# Patient Record
Sex: MALE | NOT HISPANIC OR LATINO
[De-identification: names, ages, dates, MRNs, and addresses within clinical notes are randomized per-mention and may not be internally consistent; named-entity substitution may affect disease eponyms.]

---

## 2021-06-01 ENCOUNTER — RX ONLY (RX ONLY)
Age: 81
End: 2021-06-01

## 2022-06-09 ENCOUNTER — RX ONLY (RX ONLY)
Age: 82
End: 2022-06-09

## 2023-07-03 ENCOUNTER — OFFICE VISIT (OUTPATIENT)
Dept: URBAN - METROPOLITAN AREA CLINIC 58 | Facility: CLINIC | Age: 83
End: 2023-07-03
Payer: MEDICARE

## 2023-07-03 DIAGNOSIS — H16.141 PUNCTATE KERATITIS OF RIGHT EYE: Primary | ICD-10-CM

## 2023-07-03 PROCEDURE — 99203 OFFICE O/P NEW LOW 30 MIN: CPT | Performed by: STUDENT IN AN ORGANIZED HEALTH CARE EDUCATION/TRAINING PROGRAM

## 2023-07-03 ASSESSMENT — INTRAOCULAR PRESSURE
OD: 15
OS: 15

## 2023-07-03 NOTE — IMPRESSION/PLAN
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 Plan: OD is improving. D/C FML drops. Start PF AT 3-4x a day, Systane or Refresh PF.  Continue under care of retina specialist.

## 2024-07-01 ENCOUNTER — RX ONLY (RX ONLY)
Age: 84
End: 2024-07-01

## 2024-08-01 ENCOUNTER — RX ONLY (RX ONLY)
Age: 84
End: 2024-08-01

## 2024-09-01 ENCOUNTER — RX ONLY (RX ONLY)
Age: 84
End: 2024-09-01

## 2024-11-01 ENCOUNTER — RX ONLY (RX ONLY)
Age: 84
End: 2024-11-01

## 2025-06-06 ENCOUNTER — RX ONLY (RX ONLY)
Age: 85
End: 2025-06-06

## 2025-06-10 ENCOUNTER — APPOINTMENT (OUTPATIENT)
Dept: URBAN - METROPOLITAN AREA CLINIC 145 | Facility: CLINIC | Age: 85
Setting detail: DERMATOLOGY
End: 2025-06-10

## 2025-06-10 DIAGNOSIS — D22 MELANOCYTIC NEVI: ICD-10-CM

## 2025-06-10 PROBLEM — D23.4 OTHER BENIGN NEOPLASM OF SKIN OF SCALP AND NECK: Status: ACTIVE | Noted: 2025-06-10

## 2025-06-10 PROBLEM — D22.4 MELANOCYTIC NEVI OF SCALP AND NECK: Status: ACTIVE | Noted: 2025-06-10

## 2025-06-10 PROBLEM — D48.5 NEOPLASM OF UNCERTAIN BEHAVIOR OF SKIN: Status: ACTIVE | Noted: 2025-06-10

## 2025-06-10 PROCEDURE — ? COUNSELING

## 2025-06-10 PROCEDURE — ? BIOPSY BY SHAVE METHOD

## 2025-06-10 ASSESSMENT — LOCATION DETAILED DESCRIPTION DERM: LOCATION DETAILED: LEFT CENTRAL FRONTAL SCALP

## 2025-06-10 ASSESSMENT — LOCATION ZONE DERM: LOCATION ZONE: SCALP

## 2025-06-10 ASSESSMENT — LOCATION SIMPLE DESCRIPTION DERM: LOCATION SIMPLE: LEFT SCALP

## 2025-06-30 ENCOUNTER — APPOINTMENT (OUTPATIENT)
Dept: URBAN - METROPOLITAN AREA CLINIC 145 | Facility: CLINIC | Age: 85
Setting detail: DERMATOLOGY
End: 2025-06-30

## 2025-06-30 PROBLEM — C44.319 BASAL CELL CARCINOMA OF SKIN OF OTHER PARTS OF FACE: Status: ACTIVE | Noted: 2025-06-30

## 2025-06-30 PROCEDURE — ? MOHS SURGERY

## 2025-06-30 NOTE — PROCEDURE: MOHS SURGERY
Mohs Case Number: [id number]
Date Of Previous Biopsy (Optional): 06/10/2025
Previous Accession (Optional): [id number]
Biopsy Photograph Reviewed: Yes
Referring Physician (Optional): Letty Tejada PA-C
Consent Type: Consent 1 (Standard)
Eye Shield Used: No
Surgeon Performing Repair (Optional): Dr. Harinder Palmer
Initial Size Of Lesion: 1
X Size Of Lesion In Cm (Optional): 0.7
Primary Defect Length In Cm (Final Defect Size - Required For Flaps/Grafts): 1.4
Primary Defect Width In Cm (Final Defect Size - Required For Flaps/Grafts): 1.6
Primary Defect Depth In Cm (Optional But Required For Some Insurers): 0
Repair Type: Complex Repair
Which Instrument Did You Use For Dermabrasion?: Wire Brush
Which Eyelid Repair Cpt Are You Using?: 03910
Oculoplastic Surgeon Procedure Text (A): After obtaining clear surgical margins the patient was sent to oculoplastics for surgical repair.  The patient understands they will receive post-surgical care and follow-up from the referring physician's office.
Otolaryngologist Procedure Text (A): After obtaining clear surgical margins the patient was sent to otolaryngology for surgical repair.  The patient understands they will receive post-surgical care and follow-up from the referring physician's office.
Plastic Surgeon Procedure Text (A): After obtaining clear surgical margins the patient was sent to plastics for surgical repair.  The patient understands they will receive post-surgical care and follow-up from the referring physician's office.
Mid-Level Procedure Text (A): After obtaining clear surgical margins the patient was sent to a mid-level provider for surgical repair.  The patient understands they will receive post-surgical care and follow-up from the mid-level provider.
Provider Procedure Text (A): After obtaining clear surgical margins the defect was repaired by another provider.
Asc Procedure Text (A): After obtaining clear surgical margins the patient was sent to an ASC for surgical repair.  The patient understands they will receive post-surgical care and follow-up from the ASC physician.
Simple / Intermediate / Complex Repair - Final Wound Length In Cm: 4.5
Deep Sutures: 4-0 PDO
Epidermal Sutures: 6-0 Prolene
Suture Removal: 7 days
Suturegard Retention Suture: 2-0 Nylon
Retention Suture Bite Size: 3 mm
Length To Time In Minutes Device Was In Place: 10
Undermining Type: Entire Wound
Debridement Text: The wound edges were debrided prior to proceeding with the closure to facilitate wound healing.
Helical Rim Text: The closure involved the helical rim.
Vermilion Border Text: The closure involved the vermilion border.
Nostril Rim Text: The closure involved the nostril rim.
Retention Suture Text: Retention sutures were placed to support the closure and prevent dehiscence.
Area H Indication Text: Tumors in this location are included in Area H (eyelids, eyebrows, nose, lips, chin, ear, pre-auricular, post-auricular, temple, genitalia, hands, feet, ankles and areola).  Tissue conservation is critical in these anatomic locations.
Area M Indication Text: Tumors in this location are included in Area M (cheek, forehead, scalp, neck, jawline and pretibial skin).  Mohs surgery is indicated for tumors in these anatomic locations.
Area L Indication Text: Tumors in this location are included in Area L (trunk and extremities).  Mohs surgery is indicated for larger tumors, or tumors with aggressive histologic features, in these anatomic locations.
Depth Of Tumor Invasion (For Histology): tumor not visualized
Perineural Invasion (For Histology - Be Specific If Possible): absent
Special Stains Stage 1 - Results: Base On Clearance Noted Above
Stage 2: Additional Anesthesia Type: 1% lidocaine with epinephrine
Staging Info: By selecting yes to the question above you will include information on AJCC 8 tumor staging in your Mohs note. Information on tumor staging will be automatically added for SCCs on the head and neck. AJCC 8 includes tumor size, tumor depth, perineural involvement and bone invasion.
Tumor Depth: Less than 6mm from granular layer and no invasion beyond the subcutaneous fat
Why Was The Change Made?: Please Select the Appropriate Response
Medical Necessity Statement: Based on my medical judgement, Mohs surgery is the most appropriate treatment for this cancer compared to other treatments.
Alternatives Discussed Intro (Do Not Add Period): I discussed alternative treatments to Mohs surgery and specifically discussed the risks and benefits of
Consent 1/Introductory Paragraph: The rationale for Mohs was explained to the patient and consent was obtained. The risks, benefits and alternatives to therapy were discussed in detail. Specifically, the risks of infection, scarring, bleeding, prolonged wound healing, incomplete removal, allergy to anesthesia, nerve injury and recurrence were addressed. Prior to the procedure, the treatment site was clearly identified and confirmed by the patient. All components of Universal Protocol/PAUSE Rule completed.
Consent 2/Introductory Paragraph: Mohs surgery was explained to the patient and consent was obtained. The risks, benefits and alternatives to therapy were discussed in detail. Specifically, the risks of infection, scarring, bleeding, prolonged wound healing, incomplete removal, allergy to anesthesia, nerve injury and recurrence were addressed. Prior to the procedure, the treatment site was clearly identified and confirmed by the patient. All components of Universal Protocol/PAUSE Rule completed.
Consent 3/Introductory Paragraph: I gave the patient a chance to ask questions they had about the procedure.  Following this I explained the Mohs procedure and consent was obtained. The risks, benefits and alternatives to therapy were discussed in detail. Specifically, the risks of infection, scarring, bleeding, prolonged wound healing, incomplete removal, allergy to anesthesia, nerve injury and recurrence were addressed. Prior to the procedure, the treatment site was clearly identified and confirmed by the patient. All components of Universal Protocol/PAUSE Rule completed.
Consent (Temporal Branch)/Introductory Paragraph: The rationale for Mohs was explained to the patient and consent was obtained. The risks, benefits and alternatives to therapy were discussed in detail. Specifically, the risks of damage to the temporal branch of the facial nerve, infection, scarring, bleeding, prolonged wound healing, incomplete removal, allergy to anesthesia, and recurrence were addressed. Prior to the procedure, the treatment site was clearly identified and confirmed by the patient. All components of Universal Protocol/PAUSE Rule completed.
Consent (Marginal Mandibular)/Introductory Paragraph: The rationale for Mohs was explained to the patient and consent was obtained. The risks, benefits and alternatives to therapy were discussed in detail. Specifically, the risks of damage to the marginal mandibular branch of the facial nerve, infection, scarring, bleeding, prolonged wound healing, incomplete removal, allergy to anesthesia, and recurrence were addressed. Prior to the procedure, the treatment site was clearly identified and confirmed by the patient. All components of Universal Protocol/PAUSE Rule completed.
Consent (Spinal Accessory)/Introductory Paragraph: The rationale for Mohs was explained to the patient and consent was obtained. The risks, benefits and alternatives to therapy were discussed in detail. Specifically, the risks of damage to the spinal accessory nerve, infection, scarring, bleeding, prolonged wound healing, incomplete removal, allergy to anesthesia, and recurrence were addressed. Prior to the procedure, the treatment site was clearly identified and confirmed by the patient. All components of Universal Protocol/PAUSE Rule completed.
Consent (Near Eyelid Margin)/Introductory Paragraph: The rationale for Mohs was explained to the patient and consent was obtained. The risks, benefits and alternatives to therapy were discussed in detail. Specifically, the risks of ectropion or eyelid deformity, infection, scarring, bleeding, prolonged wound healing, incomplete removal, allergy to anesthesia, nerve injury and recurrence were addressed. Prior to the procedure, the treatment site was clearly identified and confirmed by the patient. All components of Universal Protocol/PAUSE Rule completed.
Consent (Ear)/Introductory Paragraph: The rationale for Mohs was explained to the patient and consent was obtained. The risks, benefits and alternatives to therapy were discussed in detail. Specifically, the risks of ear deformity, infection, scarring, bleeding, prolonged wound healing, incomplete removal, allergy to anesthesia, nerve injury and recurrence were addressed. Prior to the procedure, the treatment site was clearly identified and confirmed by the patient. All components of Universal Protocol/PAUSE Rule completed.
Consent (Nose)/Introductory Paragraph: The rationale for Mohs was explained to the patient and consent was obtained. The risks, benefits and alternatives to therapy were discussed in detail. Specifically, the risks of nasal deformity, changes in the flow of air through the nose, infection, scarring, bleeding, prolonged wound healing, incomplete removal, allergy to anesthesia, nerve injury and recurrence were addressed. Prior to the procedure, the treatment site was clearly identified and confirmed by the patient. All components of Universal Protocol/PAUSE Rule completed.
Consent (Lip)/Introductory Paragraph: The rationale for Mohs was explained to the patient and consent was obtained. The risks, benefits and alternatives to therapy were discussed in detail. Specifically, the risks of lip deformity, changes in the oral aperture, infection, scarring, bleeding, prolonged wound healing, incomplete removal, allergy to anesthesia, nerve injury and recurrence were addressed. Prior to the procedure, the treatment site was clearly identified and confirmed by the patient. All components of Universal Protocol/PAUSE Rule completed.
Consent (Scalp)/Introductory Paragraph: The rationale for Mohs was explained to the patient and consent was obtained. The risks, benefits and alternatives to therapy were discussed in detail. Specifically, the risks of changes in hair growth pattern secondary to repair, infection, scarring, bleeding, prolonged wound healing, incomplete removal, allergy to anesthesia, nerve injury and recurrence were addressed. Prior to the procedure, the treatment site was clearly identified and confirmed by the patient. All components of Universal Protocol/PAUSE Rule completed.
Detail Level: Detailed
Postop Diagnosis: same
Surgeon: Dr. Palmer
Anesthesia Volume In Cc: 6
Hemostasis: Electrocautery
Estimated Blood Loss (Cc): minimal
Repair Anesthesia Method: local infiltration
Brow Lift Text: A midfrontal incision was made medially to the defect to allow access to the tissues just superior to the left eyebrow. Following careful dissection inferiorly in a supraperiosteal plane to the level of the left eyebrow, several 3-0 monocryl sutures were used to resuspend the eyebrow orbicularis oculi muscular unit to the superior frontal bone periosteum. This resulted in an appropriate reapproximation of static eyebrow symmetry and correction of the left brow ptosis.
Additional Deep Sutures: 5-0 PDO
Dermal Closure: buried vertical mattress
Epidermal Closure: running
Additional Epidermal Closure (Optional): vertical mattress
Suturegard Intro: Intraoperative tissue expansion was performed, utilizing the SUTUREGARD device, in order to reduce wound tension.
Suturegard Body: The suture ends were repeatedly re-tightened and re-clamped to achieve the desired tissue expansion.
Hemigard Intro: Due to skin fragility and wound tension, it was decided to use HEMIGARD adhesive retention suture devices to permit a linear closure. The skin was cleaned and dried for a 6cm distance away from the wound. Excessive hair, if present, was removed to allow for adhesion.
Hemigard Postcare Instructions: The HEMIGARD strips are to remain completely dry for at least 5-7 days.
Donor Site Anesthesia Type: same as repair anesthesia
Epidermal Closure Graft Donor Site (Optional): simple interrupted
Graft Donor Site Bandage (Optional-Leave Blank If You Don't Want In Note): Steri-strips and a pressure bandage were applied to the donor site.
Closure 2 Information: This tab is for additional flaps and grafts, including complex repair and grafts and complex repair and flaps. You can also specify a different location for the additional defect, if the location is the same you do not need to select a new one. We will insert the automated text for the repair you select below just as we do for solitary flaps and grafts. Please note that at this time if you select a location with a different insurance zone you will need to override the ICD10 and CPT if appropriate.
Closure 3 Information: This tab is for additional flaps and grafts above and beyond our usual structured repairs.  Please note if you enter information here it will not currently bill and you will need to add the billing information manually.
Wound Care: Petrolatum
Dressing: pressure dressing with telfa
Dressing (No Sutures): dry sterile dressing
Unna Boot Text: An Unna boot was placed to help immobilize the limb and facilitate more rapid healing.
Home Suture Removal Text: Patient was provided instructions on removing sutures and will remove their sutures at home.  If they have any questions or difficulties they will call the office.
Post-Care Instructions: I reviewed with the patient in detail post-care instructions. Patient is not to engage in any heavy lifting, exercise, or swimming for the next 14 days. Should the patient develop any fevers, chills, bleeding, severe pain patient will contact the office immediately.
Pain Refusal Text: I offered to prescribe pain medication but the patient refused to take this medication.
Mauc Instructions: By selecting yes to the question below the MAUC number will be added into the note.  This will be calculated automatically based on the diagnosis chosen, the size entered, the body zone selected (H,M,L) and the specific indications you chose. You will also have the option to override the Mohs AUC if you disagree with the automatically calculated number and this option is found in the Case Summary tab.
Where Do You Want The Question To Include Opioid Counseling Located?: Case Summary Tab
Eye Protection Verbiage: Before proceeding with the stage, a plastic scleral shield was inserted. The globe was anesthetized with a few drops of 1% lidocaine with 1:100,000 epinephrine. Then, an appropriate sized scleral shield was chosen and coated with lacrilube ointment. The shield was gently inserted and left in place for the duration of each stage. After the stage was completed, the shield was gently removed.
Mohs Method Verbiage: An incision at a 45 degree angle following the standard Mohs approach was done and the specimen was harvested as a microscopic controlled layer.
Surgeon/Pathologist Verbiage (Will Incorporate Name Of Surgeon From Intro If Not Blank): operated in two distinct and integrated capacities as the surgeon and pathologist.
Mohs Histo Method Verbiage: Each section was then chromacoded and processed in the Mohs lab using the Mohs protocol and submitted for frozen section, utilizing hematoxylin and eosin histochemical stains.
Subsequent Stages Histo Method Verbiage: Using a similar technique to that described above, a thin layer of tissue was removed from all areas where tumor was visible on the previous stage.  The tissue was again oriented, mapped, dyed, and processed as above.
Mohs Rapid Report Verbiage: The area of clinically evident tumor was marked with skin marking ink and appropriately hatched.  The initial incision was made following the Mohs approach through the skin.  The specimen was taken to the lab, divided into the necessary number of pieces, chromacoded and processed according to the Mohs protocol.  This was repeated in successive stages until a tumor free defect was achieved.
Complex Repair Preamble Text (Leave Blank If You Do Not Want): Extensive wide undermining was performed.
Intermediate Repair Preamble Text (Leave Blank If You Do Not Want): Undermining was performed with blunt dissection.
Graft Cartilage Fenestration Text: The cartilage was fenestrated with a 2mm punch biopsy to help facilitate graft survival and healing.
Non-Graft Cartilage Fenestration Text: The cartilage was fenestrated with a 2mm punch biopsy to help facilitate healing.
Secondary Intention Text (Leave Blank If You Do Not Want): The defect will heal with secondary intention.
No Repair - Repaired With Adjacent Surgical Defect Text (Leave Blank If You Do Not Want): After obtaining clear surgical margins the defect was repaired concurrently with another surgical defect which was in close approximation.
Adjacent Tissue Transfer Text: The defect edges were debeveled with a #15 scalpel blade. Given the location of the defect and the proximity to free margins an adjacent tissue transfer was deemed most appropriate. Using a sterile surgical marker, an appropriate flap was drawn incorporating the defect and placing the expected incisions within the relaxed skin tension lines where possible. The area thus outlined was incised deep to adipose tissue with a #15 scalpel blade. The skin margins were undermined to an appropriate distance in all directions utilizing iris scissors and carried over to close the primary defect.
Advancement Flap (Single) Text: The defect edges were debeveled with a #15 scalpel blade. Given the location of the defect and the proximity to free margins a single advancement flap was deemed most appropriate. Using a sterile surgical marker, an appropriate advancement flap was drawn incorporating the defect and placing the expected incisions within the relaxed skin tension lines where possible. The area thus outlined was incised deep to adipose tissue with a #15 scalpel blade. The skin margins were undermined to an appropriate distance in all directions utilizing iris scissors. Following this, the designed flap was advanced and carried over into the primary defect and sutured into place.
Advancement Flap (Double) Text: The defect edges were debeveled with a #15 scalpel blade. Given the location of the defect and the proximity to free margins a double advancement flap was deemed most appropriate. Using a sterile surgical marker, the appropriate advancement flaps were drawn incorporating the defect and placing the expected incisions within the relaxed skin tension lines where possible. The area thus outlined was incised deep to adipose tissue with a #15 scalpel blade. The skin margins were undermined to an appropriate distance in all directions utilizing iris scissors. Following this, the designed flaps were advanced and carried over into the primary defect and sutured into place.
Advancement-Rotation Flap Text: The defect edges were debeveled with a #15 scalpel blade. Given the location of the defect, shape of the defect and the proximity to free margins an advancement-rotation flap was deemed most appropriate. Using a sterile surgical marker, an appropriate flap was drawn incorporating the defect and placing the expected incisions within the relaxed skin tension lines where possible. The area thus outlined was incised deep to adipose tissue with a #15 scalpel blade. The skin margins were undermined to an appropriate distance in all directions utilizing iris scissors. Following this, the designed flap was carried over into the primary defect and sutured into place.
Alar Island Pedicle Flap Text: The defect edges were debeveled with a #15 scalpel blade. Given the location of the defect, shape of the defect and the proximity to the alar rim an island pedicle advancement flap was deemed most appropriate. Using a sterile surgical marker, an appropriate advancement flap was drawn incorporating the defect, outlining the appropriate donor tissue and placing the expected incisions within the nasal ala running parallel to the alar rim. The area thus outlined was incised with a #15 scalpel blade. The skin margins were undermined minimally to an appropriate distance in all directions around the primary defect and laterally outward around the island pedicle utilizing iris scissors.  There was minimal undermining beneath the pedicle flap. Following this, the designed flap was carried over into the primary defect and sutured into place.
A-T Advancement Flap Text: The defect edges were debeveled with a #15 scalpel blade. Given the location of the defect, shape of the defect and the proximity to free margins an A-T advancement flap was deemed most appropriate. Using a sterile surgical marker, an appropriate advancement flap was drawn incorporating the defect and placing the expected incisions within the relaxed skin tension lines where possible. The area thus outlined was incised deep to adipose tissue with a #15 scalpel blade. The skin margins were undermined to an appropriate distance in all directions utilizing iris scissors. Following this, the designed flap was advanced and carried over into the primary defect and sutured into place.
Banner Transposition Flap Text: The defect edges were debeveled with a #15 scalpel blade. Given the location of the defect and the proximity to free margins a Banner transposition flap was deemed most appropriate. Using a sterile surgical marker, an appropriate flap was drawn around the defect. The area thus outlined was incised deep to adipose tissue with a #15 scalpel blade. The skin margins were undermined to an appropriate distance in all directions utilizing iris scissors. Following this, the designed flap was carried into the primary defect and sutured into place.
Bilateral Helical Rim Advancement Flap Text: The defect edges were debeveled with a #15 blade scalpel.  Given the location of the defect and the proximity to free margins (helical rim) a bilateral helical rim advancement flap was deemed most appropriate. Using a sterile surgical marker, the appropriate advancement flaps were drawn incorporating the defect and placing the expected incisions between the helical rim and antihelix where possible.  The area thus outlined was incised through and through with a #15 scalpel blade.  With a skin hook and iris scissors, the flaps were gently and sharply undermined and freed up. Following this, the designed flaps were placed into the primary defect and sutured into place.
Bilateral Rotation Flap Text: The defect edges were debeveled with a #15 scalpel blade. Given the location of the defect, shape of the defect and the proximity to free margins a bilateral rotation flap was deemed most appropriate. Using a sterile surgical marker, an appropriate rotation flap was drawn incorporating the defect and placing the expected incisions within the relaxed skin tension lines where possible. The area thus outlined was incised deep to adipose tissue with a #15 scalpel blade. The skin margins were undermined to an appropriate distance in all directions utilizing iris scissors. Following this, the designed flap was carried over into the primary defect and sutured into place.
Bilobed Flap Text: The defect edges were debeveled with a #15 scalpel blade. Given the location of the defect and the proximity to free margins a bilobe flap was deemed most appropriate. Using a sterile surgical marker, an appropriate bilobe flap drawn around the defect. The area thus outlined was incised deep to adipose tissue with a #15 scalpel blade. The skin margins were undermined to an appropriate distance in all directions utilizing iris scissors. Following this, the designed flap was carried over into the primary defect and sutured into place.
Bilobed Transposition Flap Text: The defect edges were debeveled with a #15 scalpel blade. Given the location of the defect and the proximity to free margins a bilobed transposition flap was deemed most appropriate. Using a sterile surgical marker, an appropriate bilobe flap drawn around the defect. The area thus outlined was incised deep to adipose tissue with a #15 scalpel blade. The skin margins were undermined to an appropriate distance in all directions utilizing iris scissors. Following this, the designed flap was carried over into the primary defect and sutured into place.
Bi-Rhombic Flap Text: The defect edges were debeveled with a #15 scalpel blade. Given the location of the defect and the proximity to free margins a bi-rhombic flap was deemed most appropriate. Using a sterile surgical marker, an appropriate rhombic flap was drawn incorporating the defect. The area thus outlined was incised deep to adipose tissue with a #15 scalpel blade. The skin margins were undermined to an appropriate distance in all directions utilizing iris scissors. Following this, the designed flap was carried over into the primary defect and sutured into place.
Burow's Advancement Flap Text: The defect edges were debeveled with a #15 scalpel blade. Given the location of the defect and the proximity to free margins a Burow's advancement flap was deemed most appropriate. Using a sterile surgical marker, the appropriate advancement flap was drawn incorporating the defect and placing the expected incisions within the relaxed skin tension lines where possible. The area thus outlined was incised deep to adipose tissue with a #15 scalpel blade. The skin margins were undermined to an appropriate distance in all directions utilizing iris scissors. Following this, the designed flap was advanced and carried over into the primary defect and sutured into place.
Chonodrocutaneous Helical Advancement Flap Text: The defect edges were debeveled with a #15 scalpel blade. Given the location of the defect and the proximity to free margins a chondrocutaneous helical advancement flap was deemed most appropriate. Using a sterile surgical marker, the appropriate advancement flap was drawn incorporating the defect and placing the expected incisions within the relaxed skin tension lines where possible. The area thus outlined was incised deep to adipose tissue with a #15 scalpel blade. The skin margins were undermined to an appropriate distance in all directions utilizing iris scissors. Following this, the designed flap was advanced and carried over into the primary defect and sutured into place.
Crescentic Advancement Flap Text: The defect edges were debeveled with a #15 scalpel blade. Given the location of the defect and the proximity to free margins a crescentic advancement flap was deemed most appropriate. Using a sterile surgical marker, the appropriate advancement flap was drawn incorporating the defect and placing the expected incisions within the relaxed skin tension lines where possible. The area thus outlined was incised deep to adipose tissue with a #15 scalpel blade. The skin margins were undermined to an appropriate distance in all directions utilizing iris scissors. Following this, the designed flap was advanced and carried over into the primary defect and sutured into place.
Dorsal Nasal Flap Text: The defect edges were debeveled with a #15 scalpel blade. Given the location of the defect and the proximity to free margins a dorsal nasal flap was deemed most appropriate. Using a sterile surgical marker, an appropriate dorsal nasal flap was drawn around the defect. The area thus outlined was incised deep to adipose tissue with a #15 scalpel blade. The skin margins were undermined to an appropriate distance in all directions utilizing iris scissors. Following this, the designed flap was carried into the primary defect and sutured into place.
Double Island Pedicle Flap Text: The defect edges were debeveled with a #15 scalpel blade. Given the location of the defect, shape of the defect and the proximity to free margins a double island pedicle advancement flap was deemed most appropriate. Using a sterile surgical marker, an appropriate advancement flap was drawn incorporating the defect, outlining the appropriate donor tissue and placing the expected incisions within the relaxed skin tension lines where possible. The area thus outlined was incised deep to adipose tissue with a #15 scalpel blade. The skin margins were undermined to an appropriate distance in all directions around the primary defect and laterally outward around the island pedicle utilizing iris scissors.  There was minimal undermining beneath the pedicle flap. Following this, the flap was carried over into the primary defect and sutured into place.
Double O-Z Flap Text: The defect edges were debeveled with a #15 scalpel blade. Given the location of the defect, shape of the defect and the proximity to free margins a Double O-Z flap was deemed most appropriate. Using a sterile surgical marker, an appropriate transposition flap was drawn incorporating the defect and placing the expected incisions within the relaxed skin tension lines where possible. The area thus outlined was incised deep to adipose tissue with a #15 scalpel blade. The skin margins were undermined to an appropriate distance in all directions utilizing iris scissors. Following this, the designed flap was carried over into the primary defect and sutured into place.
Double O-Z Plasty Text: The defect edges were debeveled with a #15 scalpel blade. Given the location of the defect, shape of the defect and the proximity to free margins a Double O-Z plasty (double transposition flap) was deemed most appropriate. Using a sterile surgical marker, the appropriate transposition flaps were drawn incorporating the defect and placing the expected incisions within the relaxed skin tension lines where possible. The area thus outlined was incised deep to adipose tissue with a #15 scalpel blade. The skin margins were undermined to an appropriate distance in all directions utilizing iris scissors. Hemostasis was achieved with electrocautery. The flaps were then transposed and carried over into place, one clockwise and the other counterclockwise, and anchored with interrupted buried subcutaneous sutures.
Double Z Plasty Text: The lesion was extirpated to the level of the fat with a #15 scalpel blade. Given the location of the defect, shape of the defect and the proximity to free margins a double Z-plasty was deemed most appropriate for repair. Using a sterile surgical marker, the appropriate transposition arms of the double Z-plasty were drawn incorporating the defect and placing the expected incisions within the relaxed skin tension lines where possible. The area thus outlined was incised deep to adipose tissue with a #15 scalpel blade. The skin margins were undermined to an appropriate distance in all directions utilizing iris scissors. The opposing transposition arms were then transposed and carried over into place in opposite direction and anchored with interrupted buried subcutaneous sutures.
Ear Star Wedge Flap Text: The defect edges were debeveled with a #15 blade scalpel.  Given the location of the defect and the proximity to free margins (helical rim) an ear star wedge flap was deemed most appropriate. Using a sterile surgical marker, the appropriate flap was drawn incorporating the defect and placing the expected incisions between the helical rim and antihelix where possible.  The area thus outlined was incised through and through with a #15 scalpel blade. Following this, the designed flap was carried over into the primary defect and sutured into place.
Flip-Flop Flap Text: The defect edges were debeveled with a #15 blade scalpel.  Given the location of the defect and the proximity to free margins a flip-flop flap was deemed most appropriate. Using a sterile surgical marker, the appropriate flap was drawn incorporating the defect and placing the expected incisions between the helical rim and antihelix where possible.  The area thus outlined was incised through and through with a #15 scalpel blade. Following this, the designed flap was carried over into the primary defect and sutured into place.
Hatchet Flap Text: The defect edges were debeveled with a #15 scalpel blade. Given the location of the defect, shape of the defect and the proximity to free margins a hatchet flap was deemed most appropriate. Using a sterile surgical marker, an appropriate hatchet flap was drawn incorporating the defect and placing the expected incisions within the relaxed skin tension lines where possible. The area thus outlined was incised deep to adipose tissue with a #15 scalpel blade. The skin margins were undermined to an appropriate distance in all directions utilizing iris scissors. Following this, the designed flap was carried over into the primary defect and sutured into place.
Helical Rim Advancement Flap Text: The defect edges were debeveled with a #15 blade scalpel.  Given the location of the defect and the proximity to free margins (helical rim) a double helical rim advancement flap was deemed most appropriate. Using a sterile surgical marker, the appropriate advancement flaps were drawn incorporating the defect and placing the expected incisions between the helical rim and antihelix where possible.  The area thus outlined was incised through and through with a #15 scalpel blade.  With a skin hook and iris scissors, the flaps were gently and sharply undermined and freed up. Folllowing this, the designed flaps were carried over into the primary defect and sutured into place.
H Plasty Text: Given the location of the defect, shape of the defect and the proximity to free margins a H-plasty was deemed most appropriate for repair. Using a sterile surgical marker, the appropriate advancement arms of the H-plasty were drawn incorporating the defect and placing the expected incisions within the relaxed skin tension lines where possible. The area thus outlined was incised deep to adipose tissue with a #15 scalpel blade. The skin margins were undermined to an appropriate distance in all directions utilizing iris scissors.  The opposing advancement arms were then advanced and carried over into place in opposite direction and anchored with interrupted buried subcutaneous sutures.
Island Pedicle Flap Text: The defect edges were debeveled with a #15 scalpel blade. Given the location of the defect, shape of the defect and the proximity to free margins an island pedicle advancement flap was deemed most appropriate. Using a sterile surgical marker, an appropriate advancement flap was drawn incorporating the defect, outlining the appropriate donor tissue and placing the expected incisions within the relaxed skin tension lines where possible. The area thus outlined was incised deep to adipose tissue with a #15 scalpel blade. The skin margins were undermined to an appropriate distance in all directions around the primary defect and laterally outward around the island pedicle utilizing iris scissors.  There was minimal undermining beneath the pedicle flap. Following this, the flap was carried over into the primary defect and sutured into place.
Island Pedicle Flap With Canthal Suspension Text: The defect edges were debeveled with a #15 scalpel blade. Given the location of the defect, shape of the defect and the proximity to free margins an island pedicle advancement flap was deemed most appropriate. Using a sterile surgical marker, an appropriate advancement flap was drawn incorporating the defect, outlining the appropriate donor tissue and placing the expected incisions within the relaxed skin tension lines where possible. The area thus outlined was incised deep to adipose tissue with a #15 scalpel blade. The skin margins were undermined to an appropriate distance in all directions around the primary defect and laterally outward around the island pedicle utilizing iris scissors.  There was minimal undermining beneath the pedicle flap. A suspension suture was placed in the canthal tendon to prevent tension and prevent ectropion. Following this, the designed flap was placed into the primary defect and sutured into place.
Island Pedicle Flap-Requiring Vessel Identification Text: The defect edges were debeveled with a #15 scalpel blade. Given the location of the defect, shape of the defect and the proximity to free margins an island pedicle advancement flap was deemed most appropriate. Using a sterile surgical marker, an appropriate advancement flap was drawn, based on the axial vessel mentioned above, incorporating the defect, outlining the appropriate donor tissue and placing the expected incisions within the relaxed skin tension lines where possible. The area thus outlined was incised deep to adipose tissue with a #15 scalpel blade. The skin margins were undermined to an appropriate distance in all directions around the primary defect and laterally outward around the island pedicle utilizing iris scissors.  There was minimal undermining beneath the pedicle flap. Following this, the designed flap was carried over into the primary defect and sutured into place.
Keystone Flap Text: The defect edges were debeveled with a #15 scalpel blade. Given the location of the defect, shape of the defect a keystone flap was deemed most appropriate. Using a sterile surgical marker, an appropriate keystone flap was drawn incorporating the defect, outlining the appropriate donor tissue and placing the expected incisions within the relaxed skin tension lines where possible. The area thus outlined was incised deep to adipose tissue with a #15 scalpel blade. The skin margins were undermined to an appropriate distance in all directions around the primary defect and laterally outward around the flap utilizing iris scissors. Following this, the designed flap was carried into the primary defect and sutured into place.
Melolabial Transposition Flap Text: The defect edges were debeveled with a #15 scalpel blade. Given the location of the defect and the proximity to free margins a melolabial flap was deemed most appropriate. Using a sterile surgical marker, an appropriate melolabial transposition flap was drawn incorporating the defect. The area thus outlined was incised deep to adipose tissue with a #15 scalpel blade. The skin margins were undermined to an appropriate distance in all directions utilizing iris scissors. Following this, the designed flap was carried over into the primary defect and sutured into place.
Mercedes Flap Text: The defect edges were debeveled with a #15 scalpel blade. Given the location of the defect, shape of the defect and the proximity to free margins a Mercedes flap was deemed most appropriate. Using a sterile surgical marker, an appropriate advancement flap was drawn incorporating the defect and placing the expected incisions within the relaxed skin tension lines where possible. The area thus outlined was incised deep to adipose tissue with a #15 scalpel blade. The skin margins were undermined to an appropriate distance in all directions utilizing iris scissors. Following this, the designed flap was advanced and carried over into the primary defect and sutured into place.
Modified Advancement Flap Text: The defect edges were debeveled with a #15 scalpel blade. Given the location of the defect, shape of the defect and the proximity to free margins a modified advancement flap was deemed most appropriate. Using a sterile surgical marker, an appropriate advancement flap was drawn incorporating the defect and placing the expected incisions within the relaxed skin tension lines where possible. The area thus outlined was incised deep to adipose tissue with a #15 scalpel blade. The skin margins were undermined to an appropriate distance in all directions utilizing iris scissors. Following this, the designed flap was advanced and carried over into the primary defect and sutured into place.
Mucosal Advancement Flap Text: Given the location of the defect, shape of the defect and the proximity to free margins a mucosal advancement flap was deemed most appropriate. Incisions were made with a 15 blade scalpel in the appropriate fashion along the cutaneous vermilion border and the mucosal lip. The remaining actinically damaged mucosal tissue was excised.  The mucosal advancement flap was then elevated to the gingival sulcus with care taken to preserve the neurovascular structures and advanced into the primary defect. Care was taken to ensure that precise realignment of the vermilion border was achieved.
Muscle Hinge Flap Text: The defect edges were debeveled with a #15 scalpel blade.  Given the size, depth and location of the defect and the proximity to free margins a muscle hinge flap was deemed most appropriate. Using a sterile surgical marker, an appropriate hinge flap was drawn incorporating the defect. The area thus outlined was incised with a #15 scalpel blade. The skin margins were undermined to an appropriate distance in all directions utilizing iris scissors. Following this, the designed flap was carried into the primary defect and sutured into place.
Mustarde Flap Text: The defect edges were debeveled with a #15 scalpel blade.  Given the size, depth and location of the defect and the proximity to free margins a Mustarde flap was deemed most appropriate. Using a sterile surgical marker, an appropriate flap was drawn incorporating the defect. The area thus outlined was incised with a #15 scalpel blade. The skin margins were undermined to an appropriate distance in all directions utilizing iris scissors. Following this, the designed flap was carried into the primary defect and sutured into place.
Nasal Turnover Hinge Flap Text: The defect edges were debeveled with a #15 scalpel blade.  Given the size, depth, location of the defect and the defect being full thickness a nasal turnover hinge flap was deemed most appropriate. Using a sterile surgical marker, an appropriate hinge flap was drawn incorporating the defect. The area thus outlined was incised with a #15 scalpel blade. The flap was designed to recreate the nasal mucosal lining and the alar rim. The skin margins were undermined to an appropriate distance in all directions utilizing iris scissors. Following this, the designed flap was carried over into the primary defect and sutured into place
Nasalis-Muscle-Based Myocutaneous Island Pedicle Flap Text: Using a #15 blade, an incision was made around the donor flap to the level of the nasalis muscle. Wide lateral undermining was then performed in both the subcutaneous plane above the nasalis muscle, and in a submuscular plane just above periosteum. This allowed the formation of a free nasalis muscle axial pedicle (based on the angular artery) which was still attached to the actual cutaneous flap, increasing its mobility and vascular viability. Hemostasis was obtained with pinpoint electrocoagulation. The flap was mobilized into position and the pivotal anchor points positioned and stabilized with buried interrupted sutures. Subcutaneous and dermal tissues were closed in a multilayered fashion with sutures. Tissue redundancies were excised, and the epidermal edges were apposed without significant tension and sutured with sutures.
Nasalis Myocutaneous Flap Text: Using a #15 blade, an incision was made around the donor flap to the level of the nasalis muscle. Wide lateral undermining was then performed in both the subcutaneous plane above the nasalis muscle, and in a submuscular plane just above periosteum. This allowed the formation of a free nasalis muscle axial pedicle which was still attached to the actual cutaneous flap, increasing its mobility and vascular viability. Hemostasis was obtained with pinpoint electrocoagulation. The flap was mobilized into position and the pivotal anchor points positioned and stabilized with buried interrupted sutures. Subcutaneous and dermal tissues were closed in a multilayered fashion with sutures. Tissue redundancies were excised, and the epidermal edges were apposed without significant tension and sutured with sutures.
Nasolabial Transposition Flap Text: The defect edges were debeveled with a #15 scalpel blade.  Given the size, depth and location of the defect and the proximity to free margins a nasolabial transposition flap was deemed most appropriate. Using a sterile surgical marker, an appropriate flap was drawn incorporating the defect. The area thus outlined was incised with a #15 scalpel blade. The skin margins were undermined to an appropriate distance in all directions utilizing iris scissors. Following this, the designed flap was carried into the primary defect and sutured into place.
Orbicularis Oris Muscle Flap Text: The defect edges were debeveled with a #15 scalpel blade.  Given that the defect affected the competency of the oral sphincter an orbicularis oris muscle flap was deemed most appropriate to restore this competency and normal muscle function.  Using a sterile surgical marker, an appropriate flap was drawn incorporating the defect. The area thus outlined was incised with a #15 scalpel blade. Following this, the designed flap was carried over into the primary defect and sutured into place.
O-T Advancement Flap Text: The defect edges were debeveled with a #15 scalpel blade. Given the location of the defect, shape of the defect and the proximity to free margins an O-T advancement flap was deemed most appropriate. Using a sterile surgical marker, an appropriate advancement flap was drawn incorporating the defect and placing the expected incisions within the relaxed skin tension lines where possible. The area thus outlined was incised deep to adipose tissue with a #15 scalpel blade. The skin margins were undermined to an appropriate distance in all directions utilizing iris scissors. Following this, the designed flap was advanced and carried over into the primary defect and sutured into place.
O-T Plasty Text: The defect edges were debeveled with a #15 scalpel blade. Given the location of the defect, shape of the defect and the proximity to free margins an O-T plasty was deemed most appropriate. Using a sterile surgical marker, an appropriate O-T plasty was drawn incorporating the defect and placing the expected incisions within the relaxed skin tension lines where possible. The area thus outlined was incised deep to adipose tissue with a #15 scalpel blade. The skin margins were undermined to an appropriate distance in all directions utilizing iris scissors. Following this, the designed flap was carried over into the primary defect and sutured into place.
O-L Flap Text: The defect edges were debeveled with a #15 scalpel blade. Given the location of the defect, shape of the defect and the proximity to free margins an O-L flap was deemed most appropriate. Using a sterile surgical marker, an appropriate advancement flap was drawn incorporating the defect and placing the expected incisions within the relaxed skin tension lines where possible. The area thus outlined was incised deep to adipose tissue with a #15 scalpel blade. The skin margins were undermined to an appropriate distance in all directions utilizing iris scissors. Following this, the designed flap was advanced and carried over into the primary defect and sutured into place.
O-Z Flap Text: The defect edges were debeveled with a #15 scalpel blade. Given the location of the defect, shape of the defect and the proximity to free margins an O-Z flap was deemed most appropriate. Using a sterile surgical marker, an appropriate transposition flap was drawn incorporating the defect and placing the expected incisions within the relaxed skin tension lines where possible. The area thus outlined was incised deep to adipose tissue with a #15 scalpel blade. The skin margins were undermined to an appropriate distance in all directions utilizing iris scissors. Following this, the designed flap was carried over into the primary defect and sutured into place.
O-Z Plasty Text: The defect edges were debeveled with a #15 scalpel blade. Given the location of the defect, shape of the defect and the proximity to free margins an O-Z plasty (double transposition flap) was deemed most appropriate. Using a sterile surgical marker, the appropriate transposition flaps were drawn incorporating the defect and placing the expected incisions within the relaxed skin tension lines where possible. The area thus outlined was incised deep to adipose tissue with a #15 scalpel blade. The skin margins were undermined to an appropriate distance in all directions utilizing iris scissors. Hemostasis was achieved with electrocautery. The flaps were then transposed and carried over into place, one clockwise and the other counterclockwise, and anchored with interrupted buried subcutaneous sutures.
Peng Advancement Flap Text: The defect edges were debeveled with a #15 scalpel blade. Given the location of the defect, shape of the defect and the proximity to free margins a Peng advancement flap was deemed most appropriate. Using a sterile surgical marker, an appropriate advancement flap was drawn incorporating the defect and placing the expected incisions within the relaxed skin tension lines where possible. The area thus outlined was incised deep to adipose tissue with a #15 scalpel blade. The skin margins were undermined to an appropriate distance in all directions utilizing iris scissors. Following this, the designed flap was advanced and carried over into the primary defect and sutured into place.
Rectangular Flap Text: The defect edges were debeveled with a #15 scalpel blade. Given the location of the defect and the proximity to free margins a rectangular flap was deemed most appropriate. Using a sterile surgical marker, an appropriate rectangular flap was drawn incorporating the defect. The area thus outlined was incised deep to adipose tissue with a #15 scalpel blade. The skin margins were undermined to an appropriate distance in all directions utilizing iris scissors. Following this, the designed flap was carried over into the primary defect and sutured into place.
Rhombic Flap Text: The defect edges were debeveled with a #15 scalpel blade. Given the location of the defect and the proximity to free margins a rhombic flap was deemed most appropriate. Using a sterile surgical marker, an appropriate rhombic flap was drawn incorporating the defect. The area thus outlined was incised deep to adipose tissue with a #15 scalpel blade. The skin margins were undermined to an appropriate distance in all directions utilizing iris scissors. Following this, the designed flap was carried over into the primary defect and sutured into place.
Rhomboid Transposition Flap Text: The defect edges were debeveled with a #15 scalpel blade. Given the location of the defect and the proximity to free margins a rhomboid transposition flap was deemed most appropriate. Using a sterile surgical marker, an appropriate rhomboid flap was drawn incorporating the defect. The area thus outlined was incised deep to adipose tissue with a #15 scalpel blade. The skin margins were undermined to an appropriate distance in all directions utilizing iris scissors. Following this, the designed flap was carried over into the primary defect and sutured into place.
Rotation Flap Text: The defect edges were debeveled with a #15 scalpel blade. Given the location of the defect, shape of the defect and the proximity to free margins a rotation flap was deemed most appropriate. Using a sterile surgical marker, an appropriate rotation flap was drawn incorporating the defect and placing the expected incisions within the relaxed skin tension lines where possible. The area thus outlined was incised deep to adipose tissue with a #15 scalpel blade. The skin margins were undermined to an appropriate distance in all directions utilizing iris scissors. Following this, the designed flap was carried over into the primary defect and sutured into place.
Spiral Flap Text: The defect edges were debeveled with a #15 scalpel blade. Given the location of the defect, shape of the defect and the proximity to free margins a spiral flap was deemed most appropriate. Using a sterile surgical marker, an appropriate rotation flap was drawn incorporating the defect and placing the expected incisions within the relaxed skin tension lines where possible. The area thus outlined was incised deep to adipose tissue with a #15 scalpel blade. The skin margins were undermined to an appropriate distance in all directions utilizing iris scissors. Following this, the designed flap was carried over into the primary defect and sutured into place.
Staged Advancement Flap Text: The defect edges were debeveled with a #15 scalpel blade. Given the location of the defect, shape of the defect and the proximity to free margins a staged advancement flap was deemed most appropriate. Using a sterile surgical marker, an appropriate advancement flap was drawn incorporating the defect and placing the expected incisions within the relaxed skin tension lines where possible. The area thus outlined was incised deep to adipose tissue with a #15 scalpel blade. The skin margins were undermined to an appropriate distance in all directions utilizing iris scissors. Following this, the designed flap was carried over into the primary defect and sutured into place.
Star Wedge Flap Text: The defect edges were debeveled with a #15 scalpel blade. Given the location of the defect, shape of the defect and the proximity to free margins a star wedge flap was deemed most appropriate. Using a sterile surgical marker, an appropriate rotation flap was drawn incorporating the defect and placing the expected incisions within the relaxed skin tension lines where possible. The area thus outlined was incised deep to adipose tissue with a #15 scalpel blade. The skin margins were undermined to an appropriate distance in all directions utilizing iris scissors. Following this, the designed flap was carried over into the primary defect and sutured into place.
Transposition Flap Text: The defect edges were debeveled with a #15 scalpel blade. Given the location of the defect and the proximity to free margins a transposition flap was deemed most appropriate. Using a sterile surgical marker, an appropriate transposition flap was drawn incorporating the defect. The area thus outlined was incised deep to adipose tissue with a #15 scalpel blade. The skin margins were undermined to an appropriate distance in all directions utilizing iris scissors. Following this, the designed flap was carried over into the primary defect and sutured into place.
Trilobed Flap Text: The defect edges were debeveled with a #15 scalpel blade. Given the location of the defect and the proximity to free margins a trilobed flap was deemed most appropriate. Using a sterile surgical marker, an appropriate trilobed flap was drawn around the defect. The area thus outlined was incised deep to adipose tissue with a #15 scalpel blade. The skin margins were undermined to an appropriate distance in all directions utilizing iris scissors. Following this, the designed flap was carried into the primary defect and sutured into place.
V-Y Flap Text: The defect edges were debeveled with a #15 scalpel blade. Given the location of the defect, shape of the defect and the proximity to free margins a V-Y flap was deemed most appropriate. Using a sterile surgical marker, an appropriate advancement flap was drawn incorporating the defect and placing the expected incisions within the relaxed skin tension lines where possible. The area thus outlined was incised deep to adipose tissue with a #15 scalpel blade. The skin margins were undermined to an appropriate distance in all directions utilizing iris scissors. Following this, the designed flap was advanced and carried over into the primary defect and sutured into place.
V-Y Plasty Text: The defect edges were debeveled with a #15 scalpel blade. Given the location of the defect, shape of the defect and the proximity to free margins an V-Y advancement flap was deemed most appropriate. Using a sterile surgical marker, an appropriate advancement flap was drawn incorporating the defect and placing the expected incisions within the relaxed skin tension lines where possible. The area thus outlined was incised deep to adipose tissue with a #15 scalpel blade. The skin margins were undermined to an appropriate distance in all directions utilizing iris scissors. Following this, the designed flap was advanced and carried over into the primary defect and sutured into place.
W Plasty Text: The lesion was extirpated to the level of the fat with a #15 scalpel blade. Given the location of the defect, shape of the defect and the proximity to free margins a W-plasty was deemed most appropriate for repair. Using a sterile surgical marker, the appropriate transposition arms of the W-plasty were drawn incorporating the defect and placing the expected incisions within the relaxed skin tension lines where possible. The area thus outlined was incised deep to adipose tissue with a #15 scalpel blade. The skin margins were undermined to an appropriate distance in all directions utilizing iris scissors. The opposing transposition arms were then transposed and carried over into place in opposite direction and anchored with interrupted buried subcutaneous sutures.
Z Plasty Text: The lesion was extirpated to the level of the fat with a #15 scalpel blade. Given the location of the defect, shape of the defect and the proximity to free margins a Z-plasty was deemed most appropriate for repair. Using a sterile surgical marker, the appropriate transposition arms of the Z-plasty were drawn incorporating the defect and placing the expected incisions within the relaxed skin tension lines where possible. The area thus outlined was incised deep to adipose tissue with a #15 scalpel blade. The skin margins were undermined to an appropriate distance in all directions utilizing iris scissors. The opposing transposition arms were then transposed and carried over into place in opposite direction and anchored with interrupted buried subcutaneous sutures.
Zygomaticofacial Flap Text: Given the location of the defect, shape of the defect and the proximity to free margins a zygomaticofacial flap was deemed most appropriate for repair. Using a sterile surgical marker, the appropriate flap was drawn incorporating the defect and placing the expected incisions within the relaxed skin tension lines where possible. The area thus outlined was incised deep to adipose tissue with a #15 scalpel blade with preservation of a vascular pedicle.  The skin margins were undermined to an appropriate distance in all directions utilizing iris scissors. The flap was then carried over into the defect and anchored with interrupted buried subcutaneous sutures.
Abbe Flap (Lower To Upper Lip) Text: The defect of the upper lip was assessed and measured.  Given the location and size of the defect, an Abbe flap was deemed most appropriate. Using a sterile surgical marker, an appropriate Abbe flap was measured and drawn on the lower lip. Local anesthesia was then infiltrated. A scalpel was then used to incise the upper lip through and through the skin, vermilion, muscle and mucosa, leaving the flap pedicled on the opposite side.  The flap was then rotated and transferred to the lower lip defect.  The flap was then sutured into place with a three layer technique, closing the orbicularis oris muscle layer with subcutaneous buried sutures, followed by a mucosal layer and an epidermal layer.
Abbe Flap (Upper To Lower Lip) Text: The defect of the lower lip was assessed and measured.  Given the location and size of the defect, an Abbe flap was deemed most appropriate. Using a sterile surgical marker, an appropriate Abbe flap was measured and drawn on the upper lip. Local anesthesia was then infiltrated.  A scalpel was then used to incise the upper lip through and through the skin, vermilion, muscle and mucosa, leaving the flap pedicled on the opposite side.  The flap was then rotated and transferred to the lower lip defect.  The flap was then sutured into place with a three layer technique, closing the orbicularis oris muscle layer with subcutaneous buried sutures, followed by a mucosal layer and an epidermal layer.
Cheek Interpolation Flap Text: A decision was made to reconstruct the defect utilizing an interpolation axial flap and a staged reconstruction.  A telfa template was made of the defect.  This telfa template was then used to outline the Cheek Interpolation flap.  The donor area for the pedicle flap was then injected with anesthesia.  The flap was excised through the skin and subcutaneous tissue down to the layer of the underlying musculature.  The interpolation flap was carefully excised within this deep plane to maintain its blood supply.  The edges of the donor site were undermined.   The donor site was closed in a primary fashion.  The pedicle was then rotated into position and sutured.  Once the tube was sutured into place, adequate blood supply was confirmed with blanching and refill.  The pedicle was then wrapped with xeroform gauze and dressed appropriately with a telfa and gauze bandage to ensure continued blood supply and protect the attached pedicle.
Cheek-To-Nose Interpolation Flap Text: A decision was made to reconstruct the defect utilizing an interpolation axial flap and a staged reconstruction.  A telfa template was made of the defect.  This telfa template was then used to outline the Cheek-To-Nose Interpolation flap.  The donor area for the pedicle flap was then injected with anesthesia.  The flap was excised through the skin and subcutaneous tissue down to the layer of the underlying musculature.  The interpolation flap was carefully excised within this deep plane to maintain its blood supply.  The edges of the donor site were undermined.   The donor site was closed in a primary fashion.  The pedicle was then rotated into position and sutured.  Once the tube was sutured into place, adequate blood supply was confirmed with blanching and refill.  The pedicle was then wrapped with xeroform gauze and dressed appropriately with a telfa and gauze bandage to ensure continued blood supply and protect the attached pedicle.
Estlander Flap (Lower To Upper Lip) Text: The defect of the lower lip was assessed and measured.  Given the location and size of the defect, an Estlander flap was deemed most appropriate. Using a sterile surgical marker, an appropriate Estlander flap was measured and drawn on the upper lip. Local anesthesia was then infiltrated. A scalpel was then used to incise the lateral aspect of the flap, through skin, muscle and mucosa, leaving the flap pedicled medially.  The flap was then rotated and positioned to fill the lower lip defect.  The flap was then sutured into place with a three layer technique, closing the orbicularis oris muscle layer with subcutaneous buried sutures, followed by a mucosal layer and an epidermal layer.
Interpolation Flap Text: A decision was made to reconstruct the defect utilizing an interpolation axial flap and a staged reconstruction.  A telfa template was made of the defect.  This telfa template was then used to outline the interpolation flap.  The donor area for the pedicle flap was then injected with anesthesia.  The flap was excised through the skin and subcutaneous tissue down to the layer of the underlying musculature.  The interpolation flap was carefully excised within this deep plane to maintain its blood supply.  The edges of the donor site were undermined.   The donor site was closed in a primary fashion.  The pedicle was then rotated into position and sutured.  Once the tube was sutured into place, adequate blood supply was confirmed with blanching and refill.  The pedicle was then wrapped with xeroform gauze and dressed appropriately with a telfa and gauze bandage to ensure continued blood supply and protect the attached pedicle.
Melolabial Interpolation Flap Text: A decision was made to reconstruct the defect utilizing an interpolation axial flap and a staged reconstruction.  A telfa template was made of the defect.  This telfa template was then used to outline the melolabial interpolation flap.  The donor area for the pedicle flap was then injected with anesthesia.  The flap was excised through the skin and subcutaneous tissue down to the layer of the underlying musculature.  The pedicle flap was carefully excised within this deep plane to maintain its blood supply.  The edges of the donor site were undermined.   The donor site was closed in a primary fashion.  The pedicle was then rotated into position and sutured.  Once the tube was sutured into place, adequate blood supply was confirmed with blanching and refill.  The pedicle was then wrapped with xeroform gauze and dressed appropriately with a telfa and gauze bandage to ensure continued blood supply and protect the attached pedicle.
Mastoid Interpolation Flap Text: A decision was made to reconstruct the defect utilizing an interpolation axial flap and a staged reconstruction.  A telfa template was made of the defect.  This telfa template was then used to outline the mastoid interpolation flap.  The donor area for the pedicle flap was then injected with anesthesia.  The flap was excised through the skin and subcutaneous tissue down to the layer of the underlying musculature.  The pedicle flap was carefully excised within this deep plane to maintain its blood supply.  The edges of the donor site were undermined.   The donor site was closed in a primary fashion.  The pedicle was then rotated into position and sutured.  Once the tube was sutured into place, adequate blood supply was confirmed with blanching and refill.  The pedicle was then wrapped with xeroform gauze and dressed appropriately with a telfa and gauze bandage to ensure continued blood supply and protect the attached pedicle.
Paramedian Forehead Flap Text: A decision was made to reconstruct the defect utilizing an interpolation axial flap and a staged reconstruction.  A telfa template was made of the defect.  This telfa template was then used to outline the paramedian forehead pedicle flap.  The donor area for the pedicle flap was then injected with anesthesia.  The flap was excised through the skin and subcutaneous tissue down to the layer of the underlying musculature.  The pedicle flap was carefully excised within this deep plane to maintain its blood supply.  The edges of the donor site were undermined.   The donor site was closed in a primary fashion.  The pedicle was then rotated into position and sutured.  Once the tube was sutured into place, adequate blood supply was confirmed with blanching and refill.  The pedicle was then wrapped with xeroform gauze and dressed appropriately with a telfa and gauze bandage to ensure continued blood supply and protect the attached pedicle.
Posterior Auricular Interpolation Flap Text: A decision was made to reconstruct the defect utilizing an interpolation axial flap and a staged reconstruction.  A telfa template was made of the defect.  This telfa template was then used to outline the posterior auricular interpolation flap.  The donor area for the pedicle flap was then injected with anesthesia.  The flap was excised through the skin and subcutaneous tissue down to the layer of the underlying musculature.  The pedicle flap was carefully excised within this deep plane to maintain its blood supply.  The edges of the donor site were undermined.   The donor site was closed in a primary fashion.  The pedicle was then rotated into position and sutured.  Once the tube was sutured into place, adequate blood supply was confirmed with blanching and refill.  The pedicle was then wrapped with xeroform gauze and dressed appropriately with a telfa and gauze bandage to ensure continued blood supply and protect the attached pedicle.
Cheiloplasty (Complex) Text: A decision was made to reconstruct the defect with a  cheiloplasty.  The defect was undermined extensively.  Additional orbicularis oris muscle was excised with a 15 blade scalpel.  The defect was converted into a full thickness wedge to facilite a better cosmetic result.  Small vessels were then tied off with 5-0 monocyrl. The orbicularis oris, superficial fascia, adipose and dermis were then reapproximated.  After the deeper layers were approximated the epidermis was reapproximated with particular care given to realign the vermilion border.
Cheiloplasty (Less Than 50%) Text: A decision was made to reconstruct the defect with a  cheiloplasty.  The defect was undermined extensively.  Additional orbicularis oris muscle was excised with a 15 blade scalpel.  The defect was converted into a full thickness wedge, of less than 50% of the vertical height of the lip, to facilite a better cosmetic result.  Small vessels were then tied off with 5-0 monocyrl. The orbicularis oris, superficial fascia, adipose and dermis were then reapproximated.  After the deeper layers were approximated the epidermis was reapproximated with particular care given to realign the vermilion border.
Ear Wedge Repair Text: A wedge excision was completed by carrying down an excision through the full thickness of the ear and cartilage with an inward facing Burow's triangle. The wound was then closed in a layered fashion.
Full Thickness Lip Wedge Repair (Flap) Text: Given the location of the defect and the proximity to free margins a full thickness wedge repair was deemed most appropriate. Using a sterile surgical marker, the appropriate repair was drawn incorporating the defect and placing the expected incisions perpendicular to the vermilion border.  The vermilion border was also meticulously outlined to ensure appropriate reapproximation during the repair.  The area thus outlined was incised through and through with a #15 scalpel blade.  The muscularis and dermis were reaproximated with deep sutures following hemostasis. Care was taken to realign the vermilion border before proceeding with the superficial closure.  Once the vermilion was realigned the superfical and mucosal closure was finished.
Burow's Graft Text: The defect edges were debeveled with a #15 scalpel blade. Given the location of the defect, shape of the defect, the proximity to free margins and the presence of a standing cone deformity a Burow's skin graft was deemed most appropriate. The standing cone was removed and this tissue was then trimmed to the shape of the primary defect. The adipose tissue was also removed until only dermis and epidermis were left.  The skin margins of the secondary defect were undermined to an appropriate distance in all directions utilizing iris scissors.  The secondary defect was closed with interrupted buried subcutaneous sutures.  The skin edges were then re-apposed with running  sutures.  The skin graft was then placed in the primary defect and oriented appropriately.
Cartilage Graft Text: The defect edges were debeveled with a #15 scalpel blade. Given the location of the defect, shape of the defect, the fact the defect involved a full thickness cartilage defect a cartilage graft was deemed most appropriate.  An appropriate donor site was identified, cleansed, and anesthetized. The cartilage graft was then harvested and transferred to the recipient site, oriented appropriately and then sutured into place.  The secondary defect was then repaired using a primary closure.
Composite Graft Text: The defect edges were debeveled with a #15 scalpel blade. Given the location of the defect, shape of the defect, the proximity to free margins and the fact the defect was full thickness a composite graft was deemed most appropriate.  The defect was outline and then transferred to the donor site.  A full thickness graft was then excised from the donor site. The graft was then placed in the primary defect, oriented appropriately and then sutured into place.  The secondary defect was then repaired using a primary closure.
Epidermal Autograft Text: The defect edges were debeveled with a #15 scalpel blade. Given the location of the defect, shape of the defect and the proximity to free margins an epidermal autograft was deemed most appropriate. Using a sterile surgical marker, the primary defect shape was transferred to the donor site. The epidermal graft was then harvested.  The skin graft was then placed in the primary defect and oriented appropriately.
Dermal Autograft Text: The defect edges were debeveled with a #15 scalpel blade. Given the location of the defect, shape of the defect and the proximity to free margins a dermal autograft was deemed most appropriate. Using a sterile surgical marker, the primary defect shape was transferred to the donor site. The area thus outlined was incised deep to adipose tissue with a #15 scalpel blade.  The harvested graft was then trimmed of adipose and epidermal tissue until only dermis was left.  The skin graft was then placed in the primary defect and oriented appropriately.
Ftsg Text: The defect edges were debeveled with a #15 scalpel blade. Given the location of the defect, shape of the defect and the proximity to free margins a full thickness skin graft was deemed most appropriate. Using a sterile surgical marker, the primary defect shape was transferred to the donor site. The area thus outlined was incised deep to adipose tissue with a #15 scalpel blade.  The harvested graft was then trimmed of adipose tissue until only dermis and epidermis was left.  The skin margins of the secondary defect were undermined to an appropriate distance in all directions utilizing iris scissors.  The secondary defect was closed with interrupted buried subcutaneous sutures.  The skin edges were then re-apposed with running  sutures.  The skin graft was then placed in the primary defect and oriented appropriately.
Pinch Graft Text: The defect edges were debeveled with a #15 scalpel blade. Given the location of the defect, shape of the defect and the proximity to free margins a pinch graft was deemed most appropriate. Using a sterile surgical marker, the primary defect shape was transferred to the donor site. The area thus outlined was incised deep to adipose tissue with a #15 scalpel blade.  The harvested graft was then trimmed of adipose tissue until only dermis and epidermis was left. The skin margins of the secondary defect were undermined to an appropriate distance in all directions utilizing iris scissors.  The secondary defect was closed with interrupted buried subcutaneous sutures.  The skin edges were then re-apposed with running  sutures.  The skin graft was then placed in the primary defect and oriented appropriately.
Skin Substitute Text: The defect edges were debeveled with a #15 scalpel blade. Given the location of the defect, shape of the defect and the proximity to free margins a skin substitute graft was deemed most appropriate.  The graft material was trimmed to fit the size of the defect. The graft was then placed in the primary defect and oriented appropriately.
Split-Thickness Skin Graft Text: The defect edges were debeveled with a #15 scalpel blade. Given the location of the defect, shape of the defect and the proximity to free margins a split thickness skin graft was deemed most appropriate. Using a sterile surgical marker, the primary defect shape was transferred to the donor site. The split thickness graft was then harvested.  The skin graft was then placed in the primary defect and oriented appropriately.
Tissue Cultured Epidermal Autograft Text: The defect edges were debeveled with a #15 scalpel blade. Given the location of the defect, shape of the defect and the proximity to free margins a tissue cultured epidermal autograft was deemed most appropriate.  The graft was then trimmed to fit the size of the defect.  The graft was then placed in the primary defect and oriented appropriately.
Xenograft Text: The defect edges were debeveled with a #15 scalpel blade. Given the location of the defect, shape of the defect and the proximity to free margins a xenograft was deemed most appropriate.  The graft was then trimmed to fit the size of the defect.  The graft was then placed in the primary defect and oriented appropriately.
Complex Repair And Flap Additional Text (Will Appearing After The Standard Complex Repair Text): The complex repair was not sufficient to completely close the primary defect. The remaining additional defect was repaired with the flap mentioned below.
Complex Repair And Graft Additional Text (Will Appearing After The Standard Complex Repair Text): The complex repair was not sufficient to completely close the primary defect. The remaining additional defect was repaired with the graft mentioned below.
Eyelid Full Thickness Repair - 02513: The eyelid defect was full thickness which required a wedge repair of the eyelid. Special care was taken to ensure that the eyelid margin was realligned when placing sutures.
Eyelid Partial Thickness Repair - 72221: The eyelid defect was partial thickness which required a wedge repair of the eyelid. Special care was taken to ensure that the eyelid margin was realligned when placing sutures.
Intermediate Repair And Flap Additional Text (Will Appearing After The Standard Complex Repair Text): The intermediate repair was not sufficient to completely close the primary defect. The remaining additional defect was repaired with the flap mentioned below.
Intermediate Repair And Graft Additional Text (Will Appearing After The Standard Complex Repair Text): The intermediate repair was not sufficient to completely close the primary defect. The remaining additional defect was repaired with the graft mentioned below.
Localized Dermabrasion With 15 Blade Text: The patient was draped in routine manner.  Localized dermabrasion using a 15 blade was performed in routine manner to papillary dermis. This spot dermabrasion is being performed to complete skin cancer reconstruction. It also will eliminate the other sun damaged precancerous cells that are known to be part of the regional effect of a lifetime's worth of sun exposure. This localized dermabrasion is therapeutic and should not be considered cosmetic in any regard.
Localized Dermabrasion With Sand Papertext: The patient was draped in routine manner.  Localized dermabrasion using sterile sand paper was performed in routine manner to papillary dermis. This spot dermabrasion is being performed to complete skin cancer reconstruction. It also will eliminate the other sun damaged precancerous cells that are known to be part of the regional effect of a lifetime's worth of sun exposure. This localized dermabrasion is therapeutic and should not be considered cosmetic in any regard.
Localized Dermabrasion With Wire Brush Text: The patient was draped in routine manner.  Localized dermabrasion using 3 x 17 mm wire brush was performed in routine manner to papillary dermis. This spot dermabrasion is being performed to complete skin cancer reconstruction. It also will eliminate the other sun damaged precancerous cells that are known to be part of the regional effect of a lifetime's worth of sun exposure. This localized dermabrasion is therapeutic and should not be considered cosmetic in any regard.
Purse String (Simple) Text: Given the location of the defect and the characteristics of the surrounding skin a purse string closure was deemed most appropriate.  Undermining was performed circumferentially around the surgical defect.  A purse string suture was then placed and tightened.
Purse String (Intermediate) Text: Given the location of the defect and the characteristics of the surrounding skin a purse string intermediate closure was deemed most appropriate.  Undermining was performed circumferentially around the surgical defect.  A purse string suture was then placed and tightened.
Partial Purse String (Simple) Text: Given the location of the defect and the characteristics of the surrounding skin a simple purse string closure was deemed most appropriate.  Undermining was performed circumferentially around the surgical defect.  A purse string suture was then placed and tightened. Wound tension only allowed a partial closure of the circular defect.
Partial Purse String (Intermediate) Text: Given the location of the defect and the characteristics of the surrounding skin an intermediate purse string closure was deemed most appropriate.  Undermining was performed circumferentially around the surgical defect.  A purse string suture was then placed and tightened. Wound tension only allowed a partial closure of the circular defect.
Tarsorrhaphy Text: A tarsorrhaphy was performed using Frost sutures.
Manual Repair Warning Statement: We plan on removing the manually selected variable below in favor of our much easier automatic structured text blocks found in the previous tab. We decided to do this to help make the flow better and give you the full power of structured data. Manual selection is never going to be ideal in our platform and I would encourage you to avoid using manual selection from this point on, especially since I will be sunsetting this feature. It is important that you do one of two things with the customized text below. First, you can save all of the text in a word file so you can have it for future reference. Second, transfer the text to the appropriate area in the Library tab. Lastly, if there is a flap or graft type which we do not have you need to let us know right away so I can add it in before the variable is hidden. No need to panic, we plan to give you roughly 6 months to make the change.
Same Histology In Subsequent Stages Text: The pattern and morphology of the tumor is as described in the first stage.
No Residual Tumor Seen Histology Text: There were no malignant cells seen in the sections examined.
Inflammation Suggestive Of Cancer Camouflage Histology Text: There was a dense lymphocytic infiltrate which prevented adequate histologic evaluation of adjacent structures.
Bcc Histology Text: There were numerous aggregates of basaloid cells.
Bcc Infiltrative Histology Text: There were numerous aggregates of basaloid cells demonstrating an infiltrative pattern.
Mart-1 - Positive Histology Text: MART-1 staining demonstrates areas of higher density and clustering of melanocytes with Pagetoid spread upwards within the epidermis. The surgical margins are positive for tumor cells.
Mart-1 - Negative Histology Text: MART-1 staining demonstrates a normal density and pattern of melanocytes along the dermal-epidermal junction. The surgical margins are negative for tumor cells.
Information: Selecting Yes will display possible errors in your note based on the variables you have selected. This validation is only offered as a suggestion for you. PLEASE NOTE THAT THE VALIDATION TEXT WILL BE REMOVED WHEN YOU FINALIZE YOUR NOTE. IF YOU WANT TO FAX A PRELIMINARY NOTE YOU WILL NEED TO TOGGLE THIS TO 'NO' IF YOU DO NOT WANT IT IN YOUR FAXED NOTE.
Bill 59 Modifier?: No - Continue to Bill 79 Modifier

## 2025-07-07 ENCOUNTER — APPOINTMENT (OUTPATIENT)
Dept: URBAN - METROPOLITAN AREA CLINIC 145 | Facility: CLINIC | Age: 85
Setting detail: DERMATOLOGY
End: 2025-07-07

## 2025-07-07 DIAGNOSIS — Z48.02 ENCOUNTER FOR REMOVAL OF SUTURES: ICD-10-CM

## 2025-07-07 PROCEDURE — ? PHOTO-DOCUMENTATION

## 2025-07-07 PROCEDURE — ? SUTURE REMOVAL (GLOBAL PERIOD)

## 2025-07-07 ASSESSMENT — LOCATION SIMPLE DESCRIPTION DERM: LOCATION SIMPLE: LEFT ZYGOMA

## 2025-07-07 ASSESSMENT — LOCATION DETAILED DESCRIPTION DERM: LOCATION DETAILED: LEFT CENTRAL ZYGOMA

## 2025-07-07 ASSESSMENT — LOCATION ZONE DERM: LOCATION ZONE: FACE

## 2025-07-14 ENCOUNTER — APPOINTMENT (OUTPATIENT)
Dept: URBAN - METROPOLITAN AREA CLINIC 145 | Facility: CLINIC | Age: 85
Setting detail: DERMATOLOGY
End: 2025-07-14

## 2025-07-14 PROBLEM — C44.319 BASAL CELL CARCINOMA OF SKIN OF OTHER PARTS OF FACE: Status: ACTIVE | Noted: 2025-07-14

## 2025-07-14 PROCEDURE — ? MOHS SURGERY

## 2025-07-14 NOTE — PROCEDURE: MOHS SURGERY
Mohs Case Number: [id number]
Date Of Previous Biopsy (Optional): 06/10/25
Previous Accession (Optional): [id number]
Biopsy Photograph Reviewed: Yes
Referring Physician (Optional): Letty Tejada PA-C
Consent Type: Use Body Location To Select Appropriate Consent
Eye Shield Used: No
Initial Size Of Lesion: 0.5
X Size Of Lesion In Cm (Optional): 0.6
Number Of Stages: 1
Primary Defect Length In Cm (Final Defect Size - Required For Flaps/Grafts): 1.4
Primary Defect Width In Cm (Final Defect Size - Required For Flaps/Grafts): 1.1
Primary Defect Depth In Cm (Optional But Required For Some Insurers): 0
Repair Type: Intermediate Layered Repair
Which Instrument Did You Use For Dermabrasion?: Wire Brush
Which Eyelid Repair Cpt Are You Using?: 87595
Oculoplastic Surgeon Procedure Text (A): After obtaining clear surgical margins the patient was sent to oculoplastics for surgical repair.  The patient understands they will receive post-surgical care and follow-up from the referring physician's office.
Otolaryngologist Procedure Text (A): After obtaining clear surgical margins the patient was sent to otolaryngology for surgical repair.  The patient understands they will receive post-surgical care and follow-up from the referring physician's office.
Plastic Surgeon Procedure Text (A): After obtaining clear surgical margins the patient was sent to plastics for surgical repair.  The patient understands they will receive post-surgical care and follow-up from the referring physician's office.
Mid-Level Procedure Text (A): After obtaining clear surgical margins the patient was sent to a mid-level provider for surgical repair.  The patient understands they will receive post-surgical care and follow-up from the mid-level provider.
Provider Procedure Text (A): After obtaining clear surgical margins the defect was repaired by another provider.
Asc Procedure Text (A): After obtaining clear surgical margins the patient was sent to an ASC for surgical repair.  The patient understands they will receive post-surgical care and follow-up from the ASC physician.
Simple / Intermediate / Complex Repair - Final Wound Length In Cm: 4.8
Deep Sutures: 4-0 Monocryl
Epidermal Sutures: 6-0 Prolene
Suture Removal: 7 days
Suturegard Retention Suture: 2-0 Nylon
Retention Suture Bite Size: 3 mm
Length To Time In Minutes Device Was In Place: 10
Undermining Type: Entire Wound
Debridement Text: The wound edges were debrided prior to proceeding with the closure to facilitate wound healing.
Helical Rim Text: The closure involved the helical rim.
Vermilion Border Text: The closure involved the vermilion border.
Nostril Rim Text: The closure involved the nostril rim.
Retention Suture Text: Retention sutures were placed to support the closure and prevent dehiscence.
Area H Indication Text: Tumors in this location are included in Area H (eyelids, eyebrows, nose, lips, chin, ear, pre-auricular, post-auricular, temple, genitalia, hands, feet, ankles and areola).  Tissue conservation is critical in these anatomic locations.
Area M Indication Text: Tumors in this location are included in Area M (cheek, forehead, scalp, neck, jawline and pretibial skin).  Mohs surgery is indicated for tumors in these anatomic locations.
Area L Indication Text: Tumors in this location are included in Area L (trunk and extremities).  Mohs surgery is indicated for larger tumors, or tumors with aggressive histologic features, in these anatomic locations.
Perineural Invasion (For Histology - Be Specific If Possible): absent
Special Stains Stage 1 - Results: Base On Clearance Noted Above
Stage 2: Additional Anesthesia Type: 1% lidocaine with epinephrine
Staging Info: By selecting yes to the question above you will include information on AJCC 8 tumor staging in your Mohs note. Information on tumor staging will be automatically added for SCCs on the head and neck. AJCC 8 includes tumor size, tumor depth, perineural involvement and bone invasion.
Tumor Depth: Less than 6mm from granular layer and no invasion beyond the subcutaneous fat
Was The Patient On Physician Recommended Anticoagulation Therapy?: Please Select the Appropriate Response
Medical Necessity Statement: Based on my medical judgement, Mohs surgery is the most appropriate treatment for this cancer compared to other treatments.
Alternatives Discussed Intro (Do Not Add Period): I discussed alternative treatments to Mohs surgery and specifically discussed the risks and benefits of
Consent 1/Introductory Paragraph: The rationale for Mohs was explained to the patient and consent was obtained. The risks, benefits and alternatives to therapy were discussed in detail. Specifically, the risks of infection, scarring, bleeding, prolonged wound healing, incomplete removal, allergy to anesthesia, nerve injury and recurrence were addressed. Prior to the procedure, the treatment site was clearly identified and confirmed by the patient. All components of Universal Protocol/PAUSE Rule completed.
Consent 2/Introductory Paragraph: Mohs surgery was explained to the patient and consent was obtained. The risks, benefits and alternatives to therapy were discussed in detail. Specifically, the risks of infection, scarring, bleeding, prolonged wound healing, incomplete removal, allergy to anesthesia, nerve injury and recurrence were addressed. Prior to the procedure, the treatment site was clearly identified and confirmed by the patient. All components of Universal Protocol/PAUSE Rule completed.
Consent 3/Introductory Paragraph: I gave the patient a chance to ask questions they had about the procedure.  Following this I explained the Mohs procedure and consent was obtained. The risks, benefits and alternatives to therapy were discussed in detail. Specifically, the risks of infection, scarring, bleeding, prolonged wound healing, incomplete removal, allergy to anesthesia, nerve injury and recurrence were addressed. Prior to the procedure, the treatment site was clearly identified and confirmed by the patient. All components of Universal Protocol/PAUSE Rule completed.
Consent (Temporal Branch)/Introductory Paragraph: The rationale for Mohs was explained to the patient and consent was obtained. The risks, benefits and alternatives to therapy were discussed in detail. Specifically, the risks of damage to the temporal branch of the facial nerve, infection, scarring, bleeding, prolonged wound healing, incomplete removal, allergy to anesthesia, and recurrence were addressed. Prior to the procedure, the treatment site was clearly identified and confirmed by the patient. All components of Universal Protocol/PAUSE Rule completed.
Consent (Marginal Mandibular)/Introductory Paragraph: The rationale for Mohs was explained to the patient and consent was obtained. The risks, benefits and alternatives to therapy were discussed in detail. Specifically, the risks of damage to the marginal mandibular branch of the facial nerve, infection, scarring, bleeding, prolonged wound healing, incomplete removal, allergy to anesthesia, and recurrence were addressed. Prior to the procedure, the treatment site was clearly identified and confirmed by the patient. All components of Universal Protocol/PAUSE Rule completed.
Consent (Spinal Accessory)/Introductory Paragraph: The rationale for Mohs was explained to the patient and consent was obtained. The risks, benefits and alternatives to therapy were discussed in detail. Specifically, the risks of damage to the spinal accessory nerve, infection, scarring, bleeding, prolonged wound healing, incomplete removal, allergy to anesthesia, and recurrence were addressed. Prior to the procedure, the treatment site was clearly identified and confirmed by the patient. All components of Universal Protocol/PAUSE Rule completed.
Consent (Near Eyelid Margin)/Introductory Paragraph: The rationale for Mohs was explained to the patient and consent was obtained. The risks, benefits and alternatives to therapy were discussed in detail. Specifically, the risks of ectropion or eyelid deformity, infection, scarring, bleeding, prolonged wound healing, incomplete removal, allergy to anesthesia, nerve injury and recurrence were addressed. Prior to the procedure, the treatment site was clearly identified and confirmed by the patient. All components of Universal Protocol/PAUSE Rule completed.
Consent (Ear)/Introductory Paragraph: The rationale for Mohs was explained to the patient and consent was obtained. The risks, benefits and alternatives to therapy were discussed in detail. Specifically, the risks of ear deformity, infection, scarring, bleeding, prolonged wound healing, incomplete removal, allergy to anesthesia, nerve injury and recurrence were addressed. Prior to the procedure, the treatment site was clearly identified and confirmed by the patient. All components of Universal Protocol/PAUSE Rule completed.
Consent (Nose)/Introductory Paragraph: The rationale for Mohs was explained to the patient and consent was obtained. The risks, benefits and alternatives to therapy were discussed in detail. Specifically, the risks of nasal deformity, changes in the flow of air through the nose, infection, scarring, bleeding, prolonged wound healing, incomplete removal, allergy to anesthesia, nerve injury and recurrence were addressed. Prior to the procedure, the treatment site was clearly identified and confirmed by the patient. All components of Universal Protocol/PAUSE Rule completed.
Consent (Lip)/Introductory Paragraph: The rationale for Mohs was explained to the patient and consent was obtained. The risks, benefits and alternatives to therapy were discussed in detail. Specifically, the risks of lip deformity, changes in the oral aperture, infection, scarring, bleeding, prolonged wound healing, incomplete removal, allergy to anesthesia, nerve injury and recurrence were addressed. Prior to the procedure, the treatment site was clearly identified and confirmed by the patient. All components of Universal Protocol/PAUSE Rule completed.
Consent (Scalp)/Introductory Paragraph: The rationale for Mohs was explained to the patient and consent was obtained. The risks, benefits and alternatives to therapy were discussed in detail. Specifically, the risks of changes in hair growth pattern secondary to repair, infection, scarring, bleeding, prolonged wound healing, incomplete removal, allergy to anesthesia, nerve injury and recurrence were addressed. Prior to the procedure, the treatment site was clearly identified and confirmed by the patient. All components of Universal Protocol/PAUSE Rule completed.
Detail Level: Detailed
Postop Diagnosis: same
Anesthesia Volume In Cc: 3
Additional Anesthesia Volume In Cc: 6
Hemostasis: Electrocautery
Estimated Blood Loss (Cc): minimal
Repair Anesthesia Method: local infiltration
Brow Lift Text: A midfrontal incision was made medially to the defect to allow access to the tissues just superior to the left eyebrow. Following careful dissection inferiorly in a supraperiosteal plane to the level of the left eyebrow, several 3-0 monocryl sutures were used to resuspend the eyebrow orbicularis oculi muscular unit to the superior frontal bone periosteum. This resulted in an appropriate reapproximation of static eyebrow symmetry and correction of the left brow ptosis.
Dermal Closure: buried vertical mattress
Epidermal Closure: running
Suturegard Intro: Intraoperative tissue expansion was performed, utilizing the SUTUREGARD device, in order to reduce wound tension.
Suturegard Body: The suture ends were repeatedly re-tightened and re-clamped to achieve the desired tissue expansion.
Hemigard Intro: Due to skin fragility and wound tension, it was decided to use HEMIGARD adhesive retention suture devices to permit a linear closure. The skin was cleaned and dried for a 6cm distance away from the wound. Excessive hair, if present, was removed to allow for adhesion.
Hemigard Postcare Instructions: The HEMIGARD strips are to remain completely dry for at least 5-7 days.
Donor Site Anesthesia Type: same as repair anesthesia
Epidermal Closure Graft Donor Site (Optional): simple interrupted
Graft Donor Site Bandage (Optional-Leave Blank If You Don't Want In Note): Steri-strips and a pressure bandage were applied to the donor site.
Closure 2 Information: This tab is for additional flaps and grafts, including complex repair and grafts and complex repair and flaps. You can also specify a different location for the additional defect, if the location is the same you do not need to select a new one. We will insert the automated text for the repair you select below just as we do for solitary flaps and grafts. Please note that at this time if you select a location with a different insurance zone you will need to override the ICD10 and CPT if appropriate.
Closure 3 Information: This tab is for additional flaps and grafts above and beyond our usual structured repairs.  Please note if you enter information here it will not currently bill and you will need to add the billing information manually.
Wound Care: Petrolatum
Dressing: pressure dressing with telfa
Dressing (No Sutures): dry sterile dressing
Unna Boot Text: An Unna boot was placed to help immobilize the limb and facilitate more rapid healing.
Home Suture Removal Text: Patient was provided instructions on removing sutures and will remove their sutures at home.  If they have any questions or difficulties they will call the office.
Post-Care Instructions: I reviewed with the patient in detail post-care instructions. Patient is not to engage in any heavy lifting, exercise, or swimming for the next 14 days. Should the patient develop any fevers, chills, bleeding, severe pain patient will contact the office immediately.
Pain Refusal Text: I offered to prescribe pain medication but the patient refused to take this medication.
Mauc Instructions: By selecting yes to the question below the MAUC number will be added into the note.  This will be calculated automatically based on the diagnosis chosen, the size entered, the body zone selected (H,M,L) and the specific indications you chose. You will also have the option to override the Mohs AUC if you disagree with the automatically calculated number and this option is found in the Case Summary tab.
Where Do You Want The Question To Include Opioid Counseling Located?: Case Summary Tab
Eye Protection Verbiage: Before proceeding with the stage, a plastic scleral shield was inserted. The globe was anesthetized with a few drops of 1% lidocaine with 1:100,000 epinephrine. Then, an appropriate sized scleral shield was chosen and coated with lacrilube ointment. The shield was gently inserted and left in place for the duration of each stage. After the stage was completed, the shield was gently removed.
Mohs Method Verbiage: An incision at a 45 degree angle following the standard Mohs approach was done and the specimen was harvested as a microscopic controlled layer.
Surgeon/Pathologist Verbiage (Will Incorporate Name Of Surgeon From Intro If Not Blank): operated in two distinct and integrated capacities as the surgeon and pathologist.
Mohs Histo Method Verbiage: Each section was then chromacoded and processed in the Mohs lab using the Mohs protocol and submitted for frozen section, utilizing hematoxylin and eosin histochemical stains.
Subsequent Stages Histo Method Verbiage: Using a similar technique to that described above, a thin layer of tissue was removed from all areas where tumor was visible on the previous stage.  The tissue was again oriented, mapped, dyed, and processed as above.
Mohs Rapid Report Verbiage: The area of clinically evident tumor was marked with skin marking ink and appropriately hatched.  The initial incision was made following the Mohs approach through the skin.  The specimen was taken to the lab, divided into the necessary number of pieces, chromacoded and processed according to the Mohs protocol.  This was repeated in successive stages until a tumor free defect was achieved.
Complex Repair Preamble Text (Leave Blank If You Do Not Want): Extensive wide undermining was performed.
Intermediate Repair Preamble Text (Leave Blank If You Do Not Want): Undermining was performed with blunt dissection.
Graft Cartilage Fenestration Text: The cartilage was fenestrated with a 2mm punch biopsy to help facilitate graft survival and healing.
Non-Graft Cartilage Fenestration Text: The cartilage was fenestrated with a 2mm punch biopsy to help facilitate healing.
Secondary Intention Text (Leave Blank If You Do Not Want): The defect will heal with secondary intention.
No Repair - Repaired With Adjacent Surgical Defect Text (Leave Blank If You Do Not Want): After obtaining clear surgical margins the defect was repaired concurrently with another surgical defect which was in close approximation.
Adjacent Tissue Transfer Text: The defect edges were debeveled with a #15 scalpel blade. Given the location of the defect and the proximity to free margins an adjacent tissue transfer was deemed most appropriate. Using a sterile surgical marker, an appropriate flap was drawn incorporating the defect and placing the expected incisions within the relaxed skin tension lines where possible. The area thus outlined was incised deep to adipose tissue with a #15 scalpel blade. The skin margins were undermined to an appropriate distance in all directions utilizing iris scissors and carried over to close the primary defect.
Advancement Flap (Single) Text: The defect edges were debeveled with a #15 scalpel blade. Given the location of the defect and the proximity to free margins a single advancement flap was deemed most appropriate. Using a sterile surgical marker, an appropriate advancement flap was drawn incorporating the defect and placing the expected incisions within the relaxed skin tension lines where possible. The area thus outlined was incised deep to adipose tissue with a #15 scalpel blade. The skin margins were undermined to an appropriate distance in all directions utilizing iris scissors. Following this, the designed flap was advanced and carried over into the primary defect and sutured into place.
Advancement Flap (Double) Text: The defect edges were debeveled with a #15 scalpel blade. Given the location of the defect and the proximity to free margins a double advancement flap was deemed most appropriate. Using a sterile surgical marker, the appropriate advancement flaps were drawn incorporating the defect and placing the expected incisions within the relaxed skin tension lines where possible. The area thus outlined was incised deep to adipose tissue with a #15 scalpel blade. The skin margins were undermined to an appropriate distance in all directions utilizing iris scissors. Following this, the designed flaps were advanced and carried over into the primary defect and sutured into place.
Advancement-Rotation Flap Text: The defect edges were debeveled with a #15 scalpel blade. Given the location of the defect, shape of the defect and the proximity to free margins an advancement-rotation flap was deemed most appropriate. Using a sterile surgical marker, an appropriate flap was drawn incorporating the defect and placing the expected incisions within the relaxed skin tension lines where possible. The area thus outlined was incised deep to adipose tissue with a #15 scalpel blade. The skin margins were undermined to an appropriate distance in all directions utilizing iris scissors. Following this, the designed flap was carried over into the primary defect and sutured into place.
Alar Island Pedicle Flap Text: The defect edges were debeveled with a #15 scalpel blade. Given the location of the defect, shape of the defect and the proximity to the alar rim an island pedicle advancement flap was deemed most appropriate. Using a sterile surgical marker, an appropriate advancement flap was drawn incorporating the defect, outlining the appropriate donor tissue and placing the expected incisions within the nasal ala running parallel to the alar rim. The area thus outlined was incised with a #15 scalpel blade. The skin margins were undermined minimally to an appropriate distance in all directions around the primary defect and laterally outward around the island pedicle utilizing iris scissors.  There was minimal undermining beneath the pedicle flap. Following this, the designed flap was carried over into the primary defect and sutured into place.
A-T Advancement Flap Text: The defect edges were debeveled with a #15 scalpel blade. Given the location of the defect, shape of the defect and the proximity to free margins an A-T advancement flap was deemed most appropriate. Using a sterile surgical marker, an appropriate advancement flap was drawn incorporating the defect and placing the expected incisions within the relaxed skin tension lines where possible. The area thus outlined was incised deep to adipose tissue with a #15 scalpel blade. The skin margins were undermined to an appropriate distance in all directions utilizing iris scissors. Following this, the designed flap was advanced and carried over into the primary defect and sutured into place.
Banner Transposition Flap Text: The defect edges were debeveled with a #15 scalpel blade. Given the location of the defect and the proximity to free margins a Banner transposition flap was deemed most appropriate. Using a sterile surgical marker, an appropriate flap was drawn around the defect. The area thus outlined was incised deep to adipose tissue with a #15 scalpel blade. The skin margins were undermined to an appropriate distance in all directions utilizing iris scissors. Following this, the designed flap was carried into the primary defect and sutured into place.
Bilateral Helical Rim Advancement Flap Text: The defect edges were debeveled with a #15 blade scalpel.  Given the location of the defect and the proximity to free margins (helical rim) a bilateral helical rim advancement flap was deemed most appropriate. Using a sterile surgical marker, the appropriate advancement flaps were drawn incorporating the defect and placing the expected incisions between the helical rim and antihelix where possible.  The area thus outlined was incised through and through with a #15 scalpel blade.  With a skin hook and iris scissors, the flaps were gently and sharply undermined and freed up. Following this, the designed flaps were placed into the primary defect and sutured into place.
Bilateral Rotation Flap Text: The defect edges were debeveled with a #15 scalpel blade. Given the location of the defect, shape of the defect and the proximity to free margins a bilateral rotation flap was deemed most appropriate. Using a sterile surgical marker, an appropriate rotation flap was drawn incorporating the defect and placing the expected incisions within the relaxed skin tension lines where possible. The area thus outlined was incised deep to adipose tissue with a #15 scalpel blade. The skin margins were undermined to an appropriate distance in all directions utilizing iris scissors. Following this, the designed flap was carried over into the primary defect and sutured into place.
Bilobed Flap Text: The defect edges were debeveled with a #15 scalpel blade. Given the location of the defect and the proximity to free margins a bilobe flap was deemed most appropriate. Using a sterile surgical marker, an appropriate bilobe flap drawn around the defect. The area thus outlined was incised deep to adipose tissue with a #15 scalpel blade. The skin margins were undermined to an appropriate distance in all directions utilizing iris scissors. Following this, the designed flap was carried over into the primary defect and sutured into place.
Bilobed Transposition Flap Text: The defect edges were debeveled with a #15 scalpel blade. Given the location of the defect and the proximity to free margins a bilobed transposition flap was deemed most appropriate. Using a sterile surgical marker, an appropriate bilobe flap drawn around the defect. The area thus outlined was incised deep to adipose tissue with a #15 scalpel blade. The skin margins were undermined to an appropriate distance in all directions utilizing iris scissors. Following this, the designed flap was carried over into the primary defect and sutured into place.
Bi-Rhombic Flap Text: The defect edges were debeveled with a #15 scalpel blade. Given the location of the defect and the proximity to free margins a bi-rhombic flap was deemed most appropriate. Using a sterile surgical marker, an appropriate rhombic flap was drawn incorporating the defect. The area thus outlined was incised deep to adipose tissue with a #15 scalpel blade. The skin margins were undermined to an appropriate distance in all directions utilizing iris scissors. Following this, the designed flap was carried over into the primary defect and sutured into place.
Burow's Advancement Flap Text: The defect edges were debeveled with a #15 scalpel blade. Given the location of the defect and the proximity to free margins a Burow's advancement flap was deemed most appropriate. Using a sterile surgical marker, the appropriate advancement flap was drawn incorporating the defect and placing the expected incisions within the relaxed skin tension lines where possible. The area thus outlined was incised deep to adipose tissue with a #15 scalpel blade. The skin margins were undermined to an appropriate distance in all directions utilizing iris scissors. Following this, the designed flap was advanced and carried over into the primary defect and sutured into place.
Chonodrocutaneous Helical Advancement Flap Text: The defect edges were debeveled with a #15 scalpel blade. Given the location of the defect and the proximity to free margins a chondrocutaneous helical advancement flap was deemed most appropriate. Using a sterile surgical marker, the appropriate advancement flap was drawn incorporating the defect and placing the expected incisions within the relaxed skin tension lines where possible. The area thus outlined was incised deep to adipose tissue with a #15 scalpel blade. The skin margins were undermined to an appropriate distance in all directions utilizing iris scissors. Following this, the designed flap was advanced and carried over into the primary defect and sutured into place.
Crescentic Advancement Flap Text: The defect edges were debeveled with a #15 scalpel blade. Given the location of the defect and the proximity to free margins a crescentic advancement flap was deemed most appropriate. Using a sterile surgical marker, the appropriate advancement flap was drawn incorporating the defect and placing the expected incisions within the relaxed skin tension lines where possible. The area thus outlined was incised deep to adipose tissue with a #15 scalpel blade. The skin margins were undermined to an appropriate distance in all directions utilizing iris scissors. Following this, the designed flap was advanced and carried over into the primary defect and sutured into place.
Dorsal Nasal Flap Text: The defect edges were debeveled with a #15 scalpel blade. Given the location of the defect and the proximity to free margins a dorsal nasal flap was deemed most appropriate. Using a sterile surgical marker, an appropriate dorsal nasal flap was drawn around the defect. The area thus outlined was incised deep to adipose tissue with a #15 scalpel blade. The skin margins were undermined to an appropriate distance in all directions utilizing iris scissors. Following this, the designed flap was carried into the primary defect and sutured into place.
Double Island Pedicle Flap Text: The defect edges were debeveled with a #15 scalpel blade. Given the location of the defect, shape of the defect and the proximity to free margins a double island pedicle advancement flap was deemed most appropriate. Using a sterile surgical marker, an appropriate advancement flap was drawn incorporating the defect, outlining the appropriate donor tissue and placing the expected incisions within the relaxed skin tension lines where possible. The area thus outlined was incised deep to adipose tissue with a #15 scalpel blade. The skin margins were undermined to an appropriate distance in all directions around the primary defect and laterally outward around the island pedicle utilizing iris scissors.  There was minimal undermining beneath the pedicle flap. Following this, the flap was carried over into the primary defect and sutured into place.
Double O-Z Flap Text: The defect edges were debeveled with a #15 scalpel blade. Given the location of the defect, shape of the defect and the proximity to free margins a Double O-Z flap was deemed most appropriate. Using a sterile surgical marker, an appropriate transposition flap was drawn incorporating the defect and placing the expected incisions within the relaxed skin tension lines where possible. The area thus outlined was incised deep to adipose tissue with a #15 scalpel blade. The skin margins were undermined to an appropriate distance in all directions utilizing iris scissors. Following this, the designed flap was carried over into the primary defect and sutured into place.
Double O-Z Plasty Text: The defect edges were debeveled with a #15 scalpel blade. Given the location of the defect, shape of the defect and the proximity to free margins a Double O-Z plasty (double transposition flap) was deemed most appropriate. Using a sterile surgical marker, the appropriate transposition flaps were drawn incorporating the defect and placing the expected incisions within the relaxed skin tension lines where possible. The area thus outlined was incised deep to adipose tissue with a #15 scalpel blade. The skin margins were undermined to an appropriate distance in all directions utilizing iris scissors. Hemostasis was achieved with electrocautery. The flaps were then transposed and carried over into place, one clockwise and the other counterclockwise, and anchored with interrupted buried subcutaneous sutures.
Double Z Plasty Text: The lesion was extirpated to the level of the fat with a #15 scalpel blade. Given the location of the defect, shape of the defect and the proximity to free margins a double Z-plasty was deemed most appropriate for repair. Using a sterile surgical marker, the appropriate transposition arms of the double Z-plasty were drawn incorporating the defect and placing the expected incisions within the relaxed skin tension lines where possible. The area thus outlined was incised deep to adipose tissue with a #15 scalpel blade. The skin margins were undermined to an appropriate distance in all directions utilizing iris scissors. The opposing transposition arms were then transposed and carried over into place in opposite direction and anchored with interrupted buried subcutaneous sutures.
Ear Star Wedge Flap Text: The defect edges were debeveled with a #15 blade scalpel.  Given the location of the defect and the proximity to free margins (helical rim) an ear star wedge flap was deemed most appropriate. Using a sterile surgical marker, the appropriate flap was drawn incorporating the defect and placing the expected incisions between the helical rim and antihelix where possible.  The area thus outlined was incised through and through with a #15 scalpel blade. Following this, the designed flap was carried over into the primary defect and sutured into place.
Flip-Flop Flap Text: The defect edges were debeveled with a #15 blade scalpel.  Given the location of the defect and the proximity to free margins a flip-flop flap was deemed most appropriate. Using a sterile surgical marker, the appropriate flap was drawn incorporating the defect and placing the expected incisions between the helical rim and antihelix where possible.  The area thus outlined was incised through and through with a #15 scalpel blade. Following this, the designed flap was carried over into the primary defect and sutured into place.
Hatchet Flap Text: The defect edges were debeveled with a #15 scalpel blade. Given the location of the defect, shape of the defect and the proximity to free margins a hatchet flap was deemed most appropriate. Using a sterile surgical marker, an appropriate hatchet flap was drawn incorporating the defect and placing the expected incisions within the relaxed skin tension lines where possible. The area thus outlined was incised deep to adipose tissue with a #15 scalpel blade. The skin margins were undermined to an appropriate distance in all directions utilizing iris scissors. Following this, the designed flap was carried over into the primary defect and sutured into place.
Helical Rim Advancement Flap Text: The defect edges were debeveled with a #15 blade scalpel.  Given the location of the defect and the proximity to free margins (helical rim) a double helical rim advancement flap was deemed most appropriate. Using a sterile surgical marker, the appropriate advancement flaps were drawn incorporating the defect and placing the expected incisions between the helical rim and antihelix where possible.  The area thus outlined was incised through and through with a #15 scalpel blade.  With a skin hook and iris scissors, the flaps were gently and sharply undermined and freed up. Folllowing this, the designed flaps were carried over into the primary defect and sutured into place.
H Plasty Text: Given the location of the defect, shape of the defect and the proximity to free margins a H-plasty was deemed most appropriate for repair. Using a sterile surgical marker, the appropriate advancement arms of the H-plasty were drawn incorporating the defect and placing the expected incisions within the relaxed skin tension lines where possible. The area thus outlined was incised deep to adipose tissue with a #15 scalpel blade. The skin margins were undermined to an appropriate distance in all directions utilizing iris scissors.  The opposing advancement arms were then advanced and carried over into place in opposite direction and anchored with interrupted buried subcutaneous sutures.
Island Pedicle Flap Text: The defect edges were debeveled with a #15 scalpel blade. Given the location of the defect, shape of the defect and the proximity to free margins an island pedicle advancement flap was deemed most appropriate. Using a sterile surgical marker, an appropriate advancement flap was drawn incorporating the defect, outlining the appropriate donor tissue and placing the expected incisions within the relaxed skin tension lines where possible. The area thus outlined was incised deep to adipose tissue with a #15 scalpel blade. The skin margins were undermined to an appropriate distance in all directions around the primary defect and laterally outward around the island pedicle utilizing iris scissors.  There was minimal undermining beneath the pedicle flap. Following this, the flap was carried over into the primary defect and sutured into place.
Island Pedicle Flap With Canthal Suspension Text: The defect edges were debeveled with a #15 scalpel blade. Given the location of the defect, shape of the defect and the proximity to free margins an island pedicle advancement flap was deemed most appropriate. Using a sterile surgical marker, an appropriate advancement flap was drawn incorporating the defect, outlining the appropriate donor tissue and placing the expected incisions within the relaxed skin tension lines where possible. The area thus outlined was incised deep to adipose tissue with a #15 scalpel blade. The skin margins were undermined to an appropriate distance in all directions around the primary defect and laterally outward around the island pedicle utilizing iris scissors.  There was minimal undermining beneath the pedicle flap. A suspension suture was placed in the canthal tendon to prevent tension and prevent ectropion. Following this, the designed flap was placed into the primary defect and sutured into place.
Island Pedicle Flap-Requiring Vessel Identification Text: The defect edges were debeveled with a #15 scalpel blade. Given the location of the defect, shape of the defect and the proximity to free margins an island pedicle advancement flap was deemed most appropriate. Using a sterile surgical marker, an appropriate advancement flap was drawn, based on the axial vessel mentioned above, incorporating the defect, outlining the appropriate donor tissue and placing the expected incisions within the relaxed skin tension lines where possible. The area thus outlined was incised deep to adipose tissue with a #15 scalpel blade. The skin margins were undermined to an appropriate distance in all directions around the primary defect and laterally outward around the island pedicle utilizing iris scissors.  There was minimal undermining beneath the pedicle flap. Following this, the designed flap was carried over into the primary defect and sutured into place.
Keystone Flap Text: The defect edges were debeveled with a #15 scalpel blade. Given the location of the defect, shape of the defect a keystone flap was deemed most appropriate. Using a sterile surgical marker, an appropriate keystone flap was drawn incorporating the defect, outlining the appropriate donor tissue and placing the expected incisions within the relaxed skin tension lines where possible. The area thus outlined was incised deep to adipose tissue with a #15 scalpel blade. The skin margins were undermined to an appropriate distance in all directions around the primary defect and laterally outward around the flap utilizing iris scissors. Following this, the designed flap was carried into the primary defect and sutured into place.
Melolabial Transposition Flap Text: The defect edges were debeveled with a #15 scalpel blade. Given the location of the defect and the proximity to free margins a melolabial flap was deemed most appropriate. Using a sterile surgical marker, an appropriate melolabial transposition flap was drawn incorporating the defect. The area thus outlined was incised deep to adipose tissue with a #15 scalpel blade. The skin margins were undermined to an appropriate distance in all directions utilizing iris scissors. Following this, the designed flap was carried over into the primary defect and sutured into place.
Mercedes Flap Text: The defect edges were debeveled with a #15 scalpel blade. Given the location of the defect, shape of the defect and the proximity to free margins a Mercedes flap was deemed most appropriate. Using a sterile surgical marker, an appropriate advancement flap was drawn incorporating the defect and placing the expected incisions within the relaxed skin tension lines where possible. The area thus outlined was incised deep to adipose tissue with a #15 scalpel blade. The skin margins were undermined to an appropriate distance in all directions utilizing iris scissors. Following this, the designed flap was advanced and carried over into the primary defect and sutured into place.
Modified Advancement Flap Text: The defect edges were debeveled with a #15 scalpel blade. Given the location of the defect, shape of the defect and the proximity to free margins a modified advancement flap was deemed most appropriate. Using a sterile surgical marker, an appropriate advancement flap was drawn incorporating the defect and placing the expected incisions within the relaxed skin tension lines where possible. The area thus outlined was incised deep to adipose tissue with a #15 scalpel blade. The skin margins were undermined to an appropriate distance in all directions utilizing iris scissors. Following this, the designed flap was advanced and carried over into the primary defect and sutured into place.
Mucosal Advancement Flap Text: Given the location of the defect, shape of the defect and the proximity to free margins a mucosal advancement flap was deemed most appropriate. Incisions were made with a 15 blade scalpel in the appropriate fashion along the cutaneous vermilion border and the mucosal lip. The remaining actinically damaged mucosal tissue was excised.  The mucosal advancement flap was then elevated to the gingival sulcus with care taken to preserve the neurovascular structures and advanced into the primary defect. Care was taken to ensure that precise realignment of the vermilion border was achieved.
Muscle Hinge Flap Text: The defect edges were debeveled with a #15 scalpel blade.  Given the size, depth and location of the defect and the proximity to free margins a muscle hinge flap was deemed most appropriate. Using a sterile surgical marker, an appropriate hinge flap was drawn incorporating the defect. The area thus outlined was incised with a #15 scalpel blade. The skin margins were undermined to an appropriate distance in all directions utilizing iris scissors. Following this, the designed flap was carried into the primary defect and sutured into place.
Mustarde Flap Text: The defect edges were debeveled with a #15 scalpel blade.  Given the size, depth and location of the defect and the proximity to free margins a Mustarde flap was deemed most appropriate. Using a sterile surgical marker, an appropriate flap was drawn incorporating the defect. The area thus outlined was incised with a #15 scalpel blade. The skin margins were undermined to an appropriate distance in all directions utilizing iris scissors. Following this, the designed flap was carried into the primary defect and sutured into place.
Nasal Turnover Hinge Flap Text: The defect edges were debeveled with a #15 scalpel blade.  Given the size, depth, location of the defect and the defect being full thickness a nasal turnover hinge flap was deemed most appropriate. Using a sterile surgical marker, an appropriate hinge flap was drawn incorporating the defect. The area thus outlined was incised with a #15 scalpel blade. The flap was designed to recreate the nasal mucosal lining and the alar rim. The skin margins were undermined to an appropriate distance in all directions utilizing iris scissors. Following this, the designed flap was carried over into the primary defect and sutured into place
Nasalis-Muscle-Based Myocutaneous Island Pedicle Flap Text: Using a #15 blade, an incision was made around the donor flap to the level of the nasalis muscle. Wide lateral undermining was then performed in both the subcutaneous plane above the nasalis muscle, and in a submuscular plane just above periosteum. This allowed the formation of a free nasalis muscle axial pedicle (based on the angular artery) which was still attached to the actual cutaneous flap, increasing its mobility and vascular viability. Hemostasis was obtained with pinpoint electrocoagulation. The flap was mobilized into position and the pivotal anchor points positioned and stabilized with buried interrupted sutures. Subcutaneous and dermal tissues were closed in a multilayered fashion with sutures. Tissue redundancies were excised, and the epidermal edges were apposed without significant tension and sutured with sutures.
Nasalis Myocutaneous Flap Text: Using a #15 blade, an incision was made around the donor flap to the level of the nasalis muscle. Wide lateral undermining was then performed in both the subcutaneous plane above the nasalis muscle, and in a submuscular plane just above periosteum. This allowed the formation of a free nasalis muscle axial pedicle which was still attached to the actual cutaneous flap, increasing its mobility and vascular viability. Hemostasis was obtained with pinpoint electrocoagulation. The flap was mobilized into position and the pivotal anchor points positioned and stabilized with buried interrupted sutures. Subcutaneous and dermal tissues were closed in a multilayered fashion with sutures. Tissue redundancies were excised, and the epidermal edges were apposed without significant tension and sutured with sutures.
Nasolabial Transposition Flap Text: The defect edges were debeveled with a #15 scalpel blade.  Given the size, depth and location of the defect and the proximity to free margins a nasolabial transposition flap was deemed most appropriate. Using a sterile surgical marker, an appropriate flap was drawn incorporating the defect. The area thus outlined was incised with a #15 scalpel blade. The skin margins were undermined to an appropriate distance in all directions utilizing iris scissors. Following this, the designed flap was carried into the primary defect and sutured into place.
Orbicularis Oris Muscle Flap Text: The defect edges were debeveled with a #15 scalpel blade.  Given that the defect affected the competency of the oral sphincter an orbicularis oris muscle flap was deemed most appropriate to restore this competency and normal muscle function.  Using a sterile surgical marker, an appropriate flap was drawn incorporating the defect. The area thus outlined was incised with a #15 scalpel blade. Following this, the designed flap was carried over into the primary defect and sutured into place.
O-T Advancement Flap Text: The defect edges were debeveled with a #15 scalpel blade. Given the location of the defect, shape of the defect and the proximity to free margins an O-T advancement flap was deemed most appropriate. Using a sterile surgical marker, an appropriate advancement flap was drawn incorporating the defect and placing the expected incisions within the relaxed skin tension lines where possible. The area thus outlined was incised deep to adipose tissue with a #15 scalpel blade. The skin margins were undermined to an appropriate distance in all directions utilizing iris scissors. Following this, the designed flap was advanced and carried over into the primary defect and sutured into place.
O-T Plasty Text: The defect edges were debeveled with a #15 scalpel blade. Given the location of the defect, shape of the defect and the proximity to free margins an O-T plasty was deemed most appropriate. Using a sterile surgical marker, an appropriate O-T plasty was drawn incorporating the defect and placing the expected incisions within the relaxed skin tension lines where possible. The area thus outlined was incised deep to adipose tissue with a #15 scalpel blade. The skin margins were undermined to an appropriate distance in all directions utilizing iris scissors. Following this, the designed flap was carried over into the primary defect and sutured into place.
O-L Flap Text: The defect edges were debeveled with a #15 scalpel blade. Given the location of the defect, shape of the defect and the proximity to free margins an O-L flap was deemed most appropriate. Using a sterile surgical marker, an appropriate advancement flap was drawn incorporating the defect and placing the expected incisions within the relaxed skin tension lines where possible. The area thus outlined was incised deep to adipose tissue with a #15 scalpel blade. The skin margins were undermined to an appropriate distance in all directions utilizing iris scissors. Following this, the designed flap was advanced and carried over into the primary defect and sutured into place.
O-Z Flap Text: The defect edges were debeveled with a #15 scalpel blade. Given the location of the defect, shape of the defect and the proximity to free margins an O-Z flap was deemed most appropriate. Using a sterile surgical marker, an appropriate transposition flap was drawn incorporating the defect and placing the expected incisions within the relaxed skin tension lines where possible. The area thus outlined was incised deep to adipose tissue with a #15 scalpel blade. The skin margins were undermined to an appropriate distance in all directions utilizing iris scissors. Following this, the designed flap was carried over into the primary defect and sutured into place.
O-Z Plasty Text: The defect edges were debeveled with a #15 scalpel blade. Given the location of the defect, shape of the defect and the proximity to free margins an O-Z plasty (double transposition flap) was deemed most appropriate. Using a sterile surgical marker, the appropriate transposition flaps were drawn incorporating the defect and placing the expected incisions within the relaxed skin tension lines where possible. The area thus outlined was incised deep to adipose tissue with a #15 scalpel blade. The skin margins were undermined to an appropriate distance in all directions utilizing iris scissors. Hemostasis was achieved with electrocautery. The flaps were then transposed and carried over into place, one clockwise and the other counterclockwise, and anchored with interrupted buried subcutaneous sutures.
Peng Advancement Flap Text: The defect edges were debeveled with a #15 scalpel blade. Given the location of the defect, shape of the defect and the proximity to free margins a Peng advancement flap was deemed most appropriate. Using a sterile surgical marker, an appropriate advancement flap was drawn incorporating the defect and placing the expected incisions within the relaxed skin tension lines where possible. The area thus outlined was incised deep to adipose tissue with a #15 scalpel blade. The skin margins were undermined to an appropriate distance in all directions utilizing iris scissors. Following this, the designed flap was advanced and carried over into the primary defect and sutured into place.
Rectangular Flap Text: The defect edges were debeveled with a #15 scalpel blade. Given the location of the defect and the proximity to free margins a rectangular flap was deemed most appropriate. Using a sterile surgical marker, an appropriate rectangular flap was drawn incorporating the defect. The area thus outlined was incised deep to adipose tissue with a #15 scalpel blade. The skin margins were undermined to an appropriate distance in all directions utilizing iris scissors. Following this, the designed flap was carried over into the primary defect and sutured into place.
Rhombic Flap Text: The defect edges were debeveled with a #15 scalpel blade. Given the location of the defect and the proximity to free margins a rhombic flap was deemed most appropriate. Using a sterile surgical marker, an appropriate rhombic flap was drawn incorporating the defect. The area thus outlined was incised deep to adipose tissue with a #15 scalpel blade. The skin margins were undermined to an appropriate distance in all directions utilizing iris scissors. Following this, the designed flap was carried over into the primary defect and sutured into place.
Rhomboid Transposition Flap Text: The defect edges were debeveled with a #15 scalpel blade. Given the location of the defect and the proximity to free margins a rhomboid transposition flap was deemed most appropriate. Using a sterile surgical marker, an appropriate rhomboid flap was drawn incorporating the defect. The area thus outlined was incised deep to adipose tissue with a #15 scalpel blade. The skin margins were undermined to an appropriate distance in all directions utilizing iris scissors. Following this, the designed flap was carried over into the primary defect and sutured into place.
Rotation Flap Text: The defect edges were debeveled with a #15 scalpel blade. Given the location of the defect, shape of the defect and the proximity to free margins a rotation flap was deemed most appropriate. Using a sterile surgical marker, an appropriate rotation flap was drawn incorporating the defect and placing the expected incisions within the relaxed skin tension lines where possible. The area thus outlined was incised deep to adipose tissue with a #15 scalpel blade. The skin margins were undermined to an appropriate distance in all directions utilizing iris scissors. Following this, the designed flap was carried over into the primary defect and sutured into place.
Spiral Flap Text: The defect edges were debeveled with a #15 scalpel blade. Given the location of the defect, shape of the defect and the proximity to free margins a spiral flap was deemed most appropriate. Using a sterile surgical marker, an appropriate rotation flap was drawn incorporating the defect and placing the expected incisions within the relaxed skin tension lines where possible. The area thus outlined was incised deep to adipose tissue with a #15 scalpel blade. The skin margins were undermined to an appropriate distance in all directions utilizing iris scissors. Following this, the designed flap was carried over into the primary defect and sutured into place.
Staged Advancement Flap Text: The defect edges were debeveled with a #15 scalpel blade. Given the location of the defect, shape of the defect and the proximity to free margins a staged advancement flap was deemed most appropriate. Using a sterile surgical marker, an appropriate advancement flap was drawn incorporating the defect and placing the expected incisions within the relaxed skin tension lines where possible. The area thus outlined was incised deep to adipose tissue with a #15 scalpel blade. The skin margins were undermined to an appropriate distance in all directions utilizing iris scissors. Following this, the designed flap was carried over into the primary defect and sutured into place.
Star Wedge Flap Text: The defect edges were debeveled with a #15 scalpel blade. Given the location of the defect, shape of the defect and the proximity to free margins a star wedge flap was deemed most appropriate. Using a sterile surgical marker, an appropriate rotation flap was drawn incorporating the defect and placing the expected incisions within the relaxed skin tension lines where possible. The area thus outlined was incised deep to adipose tissue with a #15 scalpel blade. The skin margins were undermined to an appropriate distance in all directions utilizing iris scissors. Following this, the designed flap was carried over into the primary defect and sutured into place.
Transposition Flap Text: The defect edges were debeveled with a #15 scalpel blade. Given the location of the defect and the proximity to free margins a transposition flap was deemed most appropriate. Using a sterile surgical marker, an appropriate transposition flap was drawn incorporating the defect. The area thus outlined was incised deep to adipose tissue with a #15 scalpel blade. The skin margins were undermined to an appropriate distance in all directions utilizing iris scissors. Following this, the designed flap was carried over into the primary defect and sutured into place.
Trilobed Flap Text: The defect edges were debeveled with a #15 scalpel blade. Given the location of the defect and the proximity to free margins a trilobed flap was deemed most appropriate. Using a sterile surgical marker, an appropriate trilobed flap was drawn around the defect. The area thus outlined was incised deep to adipose tissue with a #15 scalpel blade. The skin margins were undermined to an appropriate distance in all directions utilizing iris scissors. Following this, the designed flap was carried into the primary defect and sutured into place.
V-Y Flap Text: The defect edges were debeveled with a #15 scalpel blade. Given the location of the defect, shape of the defect and the proximity to free margins a V-Y flap was deemed most appropriate. Using a sterile surgical marker, an appropriate advancement flap was drawn incorporating the defect and placing the expected incisions within the relaxed skin tension lines where possible. The area thus outlined was incised deep to adipose tissue with a #15 scalpel blade. The skin margins were undermined to an appropriate distance in all directions utilizing iris scissors. Following this, the designed flap was advanced and carried over into the primary defect and sutured into place.
V-Y Plasty Text: The defect edges were debeveled with a #15 scalpel blade. Given the location of the defect, shape of the defect and the proximity to free margins an V-Y advancement flap was deemed most appropriate. Using a sterile surgical marker, an appropriate advancement flap was drawn incorporating the defect and placing the expected incisions within the relaxed skin tension lines where possible. The area thus outlined was incised deep to adipose tissue with a #15 scalpel blade. The skin margins were undermined to an appropriate distance in all directions utilizing iris scissors. Following this, the designed flap was advanced and carried over into the primary defect and sutured into place.
W Plasty Text: The lesion was extirpated to the level of the fat with a #15 scalpel blade. Given the location of the defect, shape of the defect and the proximity to free margins a W-plasty was deemed most appropriate for repair. Using a sterile surgical marker, the appropriate transposition arms of the W-plasty were drawn incorporating the defect and placing the expected incisions within the relaxed skin tension lines where possible. The area thus outlined was incised deep to adipose tissue with a #15 scalpel blade. The skin margins were undermined to an appropriate distance in all directions utilizing iris scissors. The opposing transposition arms were then transposed and carried over into place in opposite direction and anchored with interrupted buried subcutaneous sutures.
Z Plasty Text: The lesion was extirpated to the level of the fat with a #15 scalpel blade. Given the location of the defect, shape of the defect and the proximity to free margins a Z-plasty was deemed most appropriate for repair. Using a sterile surgical marker, the appropriate transposition arms of the Z-plasty were drawn incorporating the defect and placing the expected incisions within the relaxed skin tension lines where possible. The area thus outlined was incised deep to adipose tissue with a #15 scalpel blade. The skin margins were undermined to an appropriate distance in all directions utilizing iris scissors. The opposing transposition arms were then transposed and carried over into place in opposite direction and anchored with interrupted buried subcutaneous sutures.
Zygomaticofacial Flap Text: Given the location of the defect, shape of the defect and the proximity to free margins a zygomaticofacial flap was deemed most appropriate for repair. Using a sterile surgical marker, the appropriate flap was drawn incorporating the defect and placing the expected incisions within the relaxed skin tension lines where possible. The area thus outlined was incised deep to adipose tissue with a #15 scalpel blade with preservation of a vascular pedicle.  The skin margins were undermined to an appropriate distance in all directions utilizing iris scissors. The flap was then carried over into the defect and anchored with interrupted buried subcutaneous sutures.
Abbe Flap (Lower To Upper Lip) Text: The defect of the upper lip was assessed and measured.  Given the location and size of the defect, an Abbe flap was deemed most appropriate. Using a sterile surgical marker, an appropriate Abbe flap was measured and drawn on the lower lip. Local anesthesia was then infiltrated. A scalpel was then used to incise the upper lip through and through the skin, vermilion, muscle and mucosa, leaving the flap pedicled on the opposite side.  The flap was then rotated and transferred to the lower lip defect.  The flap was then sutured into place with a three layer technique, closing the orbicularis oris muscle layer with subcutaneous buried sutures, followed by a mucosal layer and an epidermal layer.
Abbe Flap (Upper To Lower Lip) Text: The defect of the lower lip was assessed and measured.  Given the location and size of the defect, an Abbe flap was deemed most appropriate. Using a sterile surgical marker, an appropriate Abbe flap was measured and drawn on the upper lip. Local anesthesia was then infiltrated.  A scalpel was then used to incise the upper lip through and through the skin, vermilion, muscle and mucosa, leaving the flap pedicled on the opposite side.  The flap was then rotated and transferred to the lower lip defect.  The flap was then sutured into place with a three layer technique, closing the orbicularis oris muscle layer with subcutaneous buried sutures, followed by a mucosal layer and an epidermal layer.
Cheek Interpolation Flap Text: A decision was made to reconstruct the defect utilizing an interpolation axial flap and a staged reconstruction.  A telfa template was made of the defect.  This telfa template was then used to outline the Cheek Interpolation flap.  The donor area for the pedicle flap was then injected with anesthesia.  The flap was excised through the skin and subcutaneous tissue down to the layer of the underlying musculature.  The interpolation flap was carefully excised within this deep plane to maintain its blood supply.  The edges of the donor site were undermined.   The donor site was closed in a primary fashion.  The pedicle was then rotated into position and sutured.  Once the tube was sutured into place, adequate blood supply was confirmed with blanching and refill.  The pedicle was then wrapped with xeroform gauze and dressed appropriately with a telfa and gauze bandage to ensure continued blood supply and protect the attached pedicle.
Cheek-To-Nose Interpolation Flap Text: A decision was made to reconstruct the defect utilizing an interpolation axial flap and a staged reconstruction.  A telfa template was made of the defect.  This telfa template was then used to outline the Cheek-To-Nose Interpolation flap.  The donor area for the pedicle flap was then injected with anesthesia.  The flap was excised through the skin and subcutaneous tissue down to the layer of the underlying musculature.  The interpolation flap was carefully excised within this deep plane to maintain its blood supply.  The edges of the donor site were undermined.   The donor site was closed in a primary fashion.  The pedicle was then rotated into position and sutured.  Once the tube was sutured into place, adequate blood supply was confirmed with blanching and refill.  The pedicle was then wrapped with xeroform gauze and dressed appropriately with a telfa and gauze bandage to ensure continued blood supply and protect the attached pedicle.
Estlander Flap (Lower To Upper Lip) Text: The defect of the lower lip was assessed and measured.  Given the location and size of the defect, an Estlander flap was deemed most appropriate. Using a sterile surgical marker, an appropriate Estlander flap was measured and drawn on the upper lip. Local anesthesia was then infiltrated. A scalpel was then used to incise the lateral aspect of the flap, through skin, muscle and mucosa, leaving the flap pedicled medially.  The flap was then rotated and positioned to fill the lower lip defect.  The flap was then sutured into place with a three layer technique, closing the orbicularis oris muscle layer with subcutaneous buried sutures, followed by a mucosal layer and an epidermal layer.
Interpolation Flap Text: A decision was made to reconstruct the defect utilizing an interpolation axial flap and a staged reconstruction.  A telfa template was made of the defect.  This telfa template was then used to outline the interpolation flap.  The donor area for the pedicle flap was then injected with anesthesia.  The flap was excised through the skin and subcutaneous tissue down to the layer of the underlying musculature.  The interpolation flap was carefully excised within this deep plane to maintain its blood supply.  The edges of the donor site were undermined.   The donor site was closed in a primary fashion.  The pedicle was then rotated into position and sutured.  Once the tube was sutured into place, adequate blood supply was confirmed with blanching and refill.  The pedicle was then wrapped with xeroform gauze and dressed appropriately with a telfa and gauze bandage to ensure continued blood supply and protect the attached pedicle.
Melolabial Interpolation Flap Text: A decision was made to reconstruct the defect utilizing an interpolation axial flap and a staged reconstruction.  A telfa template was made of the defect.  This telfa template was then used to outline the melolabial interpolation flap.  The donor area for the pedicle flap was then injected with anesthesia.  The flap was excised through the skin and subcutaneous tissue down to the layer of the underlying musculature.  The pedicle flap was carefully excised within this deep plane to maintain its blood supply.  The edges of the donor site were undermined.   The donor site was closed in a primary fashion.  The pedicle was then rotated into position and sutured.  Once the tube was sutured into place, adequate blood supply was confirmed with blanching and refill.  The pedicle was then wrapped with xeroform gauze and dressed appropriately with a telfa and gauze bandage to ensure continued blood supply and protect the attached pedicle.
Mastoid Interpolation Flap Text: A decision was made to reconstruct the defect utilizing an interpolation axial flap and a staged reconstruction.  A telfa template was made of the defect.  This telfa template was then used to outline the mastoid interpolation flap.  The donor area for the pedicle flap was then injected with anesthesia.  The flap was excised through the skin and subcutaneous tissue down to the layer of the underlying musculature.  The pedicle flap was carefully excised within this deep plane to maintain its blood supply.  The edges of the donor site were undermined.   The donor site was closed in a primary fashion.  The pedicle was then rotated into position and sutured.  Once the tube was sutured into place, adequate blood supply was confirmed with blanching and refill.  The pedicle was then wrapped with xeroform gauze and dressed appropriately with a telfa and gauze bandage to ensure continued blood supply and protect the attached pedicle.
Paramedian Forehead Flap Text: A decision was made to reconstruct the defect utilizing an interpolation axial flap and a staged reconstruction.  A telfa template was made of the defect.  This telfa template was then used to outline the paramedian forehead pedicle flap.  The donor area for the pedicle flap was then injected with anesthesia.  The flap was excised through the skin and subcutaneous tissue down to the layer of the underlying musculature.  The pedicle flap was carefully excised within this deep plane to maintain its blood supply.  The edges of the donor site were undermined.   The donor site was closed in a primary fashion.  The pedicle was then rotated into position and sutured.  Once the tube was sutured into place, adequate blood supply was confirmed with blanching and refill.  The pedicle was then wrapped with xeroform gauze and dressed appropriately with a telfa and gauze bandage to ensure continued blood supply and protect the attached pedicle.
Posterior Auricular Interpolation Flap Text: A decision was made to reconstruct the defect utilizing an interpolation axial flap and a staged reconstruction.  A telfa template was made of the defect.  This telfa template was then used to outline the posterior auricular interpolation flap.  The donor area for the pedicle flap was then injected with anesthesia.  The flap was excised through the skin and subcutaneous tissue down to the layer of the underlying musculature.  The pedicle flap was carefully excised within this deep plane to maintain its blood supply.  The edges of the donor site were undermined.   The donor site was closed in a primary fashion.  The pedicle was then rotated into position and sutured.  Once the tube was sutured into place, adequate blood supply was confirmed with blanching and refill.  The pedicle was then wrapped with xeroform gauze and dressed appropriately with a telfa and gauze bandage to ensure continued blood supply and protect the attached pedicle.
Cheiloplasty (Complex) Text: A decision was made to reconstruct the defect with a  cheiloplasty.  The defect was undermined extensively.  Additional orbicularis oris muscle was excised with a 15 blade scalpel.  The defect was converted into a full thickness wedge to facilite a better cosmetic result.  Small vessels were then tied off with 5-0 monocyrl. The orbicularis oris, superficial fascia, adipose and dermis were then reapproximated.  After the deeper layers were approximated the epidermis was reapproximated with particular care given to realign the vermilion border.
Cheiloplasty (Less Than 50%) Text: A decision was made to reconstruct the defect with a  cheiloplasty.  The defect was undermined extensively.  Additional orbicularis oris muscle was excised with a 15 blade scalpel.  The defect was converted into a full thickness wedge, of less than 50% of the vertical height of the lip, to facilite a better cosmetic result.  Small vessels were then tied off with 5-0 monocyrl. The orbicularis oris, superficial fascia, adipose and dermis were then reapproximated.  After the deeper layers were approximated the epidermis was reapproximated with particular care given to realign the vermilion border.
Ear Wedge Repair Text: A wedge excision was completed by carrying down an excision through the full thickness of the ear and cartilage with an inward facing Burow's triangle. The wound was then closed in a layered fashion.
Full Thickness Lip Wedge Repair (Flap) Text: Given the location of the defect and the proximity to free margins a full thickness wedge repair was deemed most appropriate. Using a sterile surgical marker, the appropriate repair was drawn incorporating the defect and placing the expected incisions perpendicular to the vermilion border.  The vermilion border was also meticulously outlined to ensure appropriate reapproximation during the repair.  The area thus outlined was incised through and through with a #15 scalpel blade.  The muscularis and dermis were reaproximated with deep sutures following hemostasis. Care was taken to realign the vermilion border before proceeding with the superficial closure.  Once the vermilion was realigned the superfical and mucosal closure was finished.
Burow's Graft Text: The defect edges were debeveled with a #15 scalpel blade. Given the location of the defect, shape of the defect, the proximity to free margins and the presence of a standing cone deformity a Burow's skin graft was deemed most appropriate. The standing cone was removed and this tissue was then trimmed to the shape of the primary defect. The adipose tissue was also removed until only dermis and epidermis were left.  The skin margins of the secondary defect were undermined to an appropriate distance in all directions utilizing iris scissors.  The secondary defect was closed with interrupted buried subcutaneous sutures.  The skin edges were then re-apposed with running  sutures.  The skin graft was then placed in the primary defect and oriented appropriately.
Cartilage Graft Text: The defect edges were debeveled with a #15 scalpel blade. Given the location of the defect, shape of the defect, the fact the defect involved a full thickness cartilage defect a cartilage graft was deemed most appropriate.  An appropriate donor site was identified, cleansed, and anesthetized. The cartilage graft was then harvested and transferred to the recipient site, oriented appropriately and then sutured into place.  The secondary defect was then repaired using a primary closure.
Composite Graft Text: The defect edges were debeveled with a #15 scalpel blade. Given the location of the defect, shape of the defect, the proximity to free margins and the fact the defect was full thickness a composite graft was deemed most appropriate.  The defect was outline and then transferred to the donor site.  A full thickness graft was then excised from the donor site. The graft was then placed in the primary defect, oriented appropriately and then sutured into place.  The secondary defect was then repaired using a primary closure.
Epidermal Autograft Text: The defect edges were debeveled with a #15 scalpel blade. Given the location of the defect, shape of the defect and the proximity to free margins an epidermal autograft was deemed most appropriate. Using a sterile surgical marker, the primary defect shape was transferred to the donor site. The epidermal graft was then harvested.  The skin graft was then placed in the primary defect and oriented appropriately.
Dermal Autograft Text: The defect edges were debeveled with a #15 scalpel blade. Given the location of the defect, shape of the defect and the proximity to free margins a dermal autograft was deemed most appropriate. Using a sterile surgical marker, the primary defect shape was transferred to the donor site. The area thus outlined was incised deep to adipose tissue with a #15 scalpel blade.  The harvested graft was then trimmed of adipose and epidermal tissue until only dermis was left.  The skin graft was then placed in the primary defect and oriented appropriately.
Ftsg Text: The defect edges were debeveled with a #15 scalpel blade. Given the location of the defect, shape of the defect and the proximity to free margins a full thickness skin graft was deemed most appropriate. Using a sterile surgical marker, the primary defect shape was transferred to the donor site. The area thus outlined was incised deep to adipose tissue with a #15 scalpel blade.  The harvested graft was then trimmed of adipose tissue until only dermis and epidermis was left.  The skin margins of the secondary defect were undermined to an appropriate distance in all directions utilizing iris scissors.  The secondary defect was closed with interrupted buried subcutaneous sutures.  The skin edges were then re-apposed with running  sutures.  The skin graft was then placed in the primary defect and oriented appropriately.
Pinch Graft Text: The defect edges were debeveled with a #15 scalpel blade. Given the location of the defect, shape of the defect and the proximity to free margins a pinch graft was deemed most appropriate. Using a sterile surgical marker, the primary defect shape was transferred to the donor site. The area thus outlined was incised deep to adipose tissue with a #15 scalpel blade.  The harvested graft was then trimmed of adipose tissue until only dermis and epidermis was left. The skin margins of the secondary defect were undermined to an appropriate distance in all directions utilizing iris scissors.  The secondary defect was closed with interrupted buried subcutaneous sutures.  The skin edges were then re-apposed with running  sutures.  The skin graft was then placed in the primary defect and oriented appropriately.
Skin Substitute Text: The defect edges were debeveled with a #15 scalpel blade. Given the location of the defect, shape of the defect and the proximity to free margins a skin substitute graft was deemed most appropriate.  The graft material was trimmed to fit the size of the defect. The graft was then placed in the primary defect and oriented appropriately.
Split-Thickness Skin Graft Text: The defect edges were debeveled with a #15 scalpel blade. Given the location of the defect, shape of the defect and the proximity to free margins a split thickness skin graft was deemed most appropriate. Using a sterile surgical marker, the primary defect shape was transferred to the donor site. The split thickness graft was then harvested.  The skin graft was then placed in the primary defect and oriented appropriately.
Tissue Cultured Epidermal Autograft Text: The defect edges were debeveled with a #15 scalpel blade. Given the location of the defect, shape of the defect and the proximity to free margins a tissue cultured epidermal autograft was deemed most appropriate.  The graft was then trimmed to fit the size of the defect.  The graft was then placed in the primary defect and oriented appropriately.
Xenograft Text: The defect edges were debeveled with a #15 scalpel blade. Given the location of the defect, shape of the defect and the proximity to free margins a xenograft was deemed most appropriate.  The graft was then trimmed to fit the size of the defect.  The graft was then placed in the primary defect and oriented appropriately.
Complex Repair And Flap Additional Text (Will Appearing After The Standard Complex Repair Text): The complex repair was not sufficient to completely close the primary defect. The remaining additional defect was repaired with the flap mentioned below.
Complex Repair And Graft Additional Text (Will Appearing After The Standard Complex Repair Text): The complex repair was not sufficient to completely close the primary defect. The remaining additional defect was repaired with the graft mentioned below.
Eyelid Full Thickness Repair - 66815: The eyelid defect was full thickness which required a wedge repair of the eyelid. Special care was taken to ensure that the eyelid margin was realligned when placing sutures.
Eyelid Partial Thickness Repair - 32875: The eyelid defect was partial thickness which required a wedge repair of the eyelid. Special care was taken to ensure that the eyelid margin was realligned when placing sutures.
Intermediate Repair And Flap Additional Text (Will Appearing After The Standard Complex Repair Text): The intermediate repair was not sufficient to completely close the primary defect. The remaining additional defect was repaired with the flap mentioned below.
Intermediate Repair And Graft Additional Text (Will Appearing After The Standard Complex Repair Text): The intermediate repair was not sufficient to completely close the primary defect. The remaining additional defect was repaired with the graft mentioned below.
Localized Dermabrasion With 15 Blade Text: The patient was draped in routine manner.  Localized dermabrasion using a 15 blade was performed in routine manner to papillary dermis. This spot dermabrasion is being performed to complete skin cancer reconstruction. It also will eliminate the other sun damaged precancerous cells that are known to be part of the regional effect of a lifetime's worth of sun exposure. This localized dermabrasion is therapeutic and should not be considered cosmetic in any regard.
Localized Dermabrasion With Sand Papertext: The patient was draped in routine manner.  Localized dermabrasion using sterile sand paper was performed in routine manner to papillary dermis. This spot dermabrasion is being performed to complete skin cancer reconstruction. It also will eliminate the other sun damaged precancerous cells that are known to be part of the regional effect of a lifetime's worth of sun exposure. This localized dermabrasion is therapeutic and should not be considered cosmetic in any regard.
Localized Dermabrasion With Wire Brush Text: The patient was draped in routine manner.  Localized dermabrasion using 3 x 17 mm wire brush was performed in routine manner to papillary dermis. This spot dermabrasion is being performed to complete skin cancer reconstruction. It also will eliminate the other sun damaged precancerous cells that are known to be part of the regional effect of a lifetime's worth of sun exposure. This localized dermabrasion is therapeutic and should not be considered cosmetic in any regard.
Purse String (Simple) Text: Given the location of the defect and the characteristics of the surrounding skin a purse string closure was deemed most appropriate.  Undermining was performed circumferentially around the surgical defect.  A purse string suture was then placed and tightened.
Purse String (Intermediate) Text: Given the location of the defect and the characteristics of the surrounding skin a purse string intermediate closure was deemed most appropriate.  Undermining was performed circumferentially around the surgical defect.  A purse string suture was then placed and tightened.
Partial Purse String (Simple) Text: Given the location of the defect and the characteristics of the surrounding skin a simple purse string closure was deemed most appropriate.  Undermining was performed circumferentially around the surgical defect.  A purse string suture was then placed and tightened. Wound tension only allowed a partial closure of the circular defect.
Partial Purse String (Intermediate) Text: Given the location of the defect and the characteristics of the surrounding skin an intermediate purse string closure was deemed most appropriate.  Undermining was performed circumferentially around the surgical defect.  A purse string suture was then placed and tightened. Wound tension only allowed a partial closure of the circular defect.
Tarsorrhaphy Text: A tarsorrhaphy was performed using Frost sutures.
Manual Repair Warning Statement: We plan on removing the manually selected variable below in favor of our much easier automatic structured text blocks found in the previous tab. We decided to do this to help make the flow better and give you the full power of structured data. Manual selection is never going to be ideal in our platform and I would encourage you to avoid using manual selection from this point on, especially since I will be sunsetting this feature. It is important that you do one of two things with the customized text below. First, you can save all of the text in a word file so you can have it for future reference. Second, transfer the text to the appropriate area in the Library tab. Lastly, if there is a flap or graft type which we do not have you need to let us know right away so I can add it in before the variable is hidden. No need to panic, we plan to give you roughly 6 months to make the change.
Same Histology In Subsequent Stages Text: The pattern and morphology of the tumor is as described in the first stage.
No Residual Tumor Seen Histology Text: There were no malignant cells seen in the sections examined.
Inflammation Suggestive Of Cancer Camouflage Histology Text: There was a dense lymphocytic infiltrate which prevented adequate histologic evaluation of adjacent structures.
Bcc Histology Text: There were numerous aggregates of basaloid cells.
Bcc Infiltrative Histology Text: There were numerous aggregates of basaloid cells demonstrating an infiltrative pattern.
Mart-1 - Positive Histology Text: MART-1 staining demonstrates areas of higher density and clustering of melanocytes with Pagetoid spread upwards within the epidermis. The surgical margins are positive for tumor cells.
Mart-1 - Negative Histology Text: MART-1 staining demonstrates a normal density and pattern of melanocytes along the dermal-epidermal junction. The surgical margins are negative for tumor cells.
Information: Selecting Yes will display possible errors in your note based on the variables you have selected. This validation is only offered as a suggestion for you. PLEASE NOTE THAT THE VALIDATION TEXT WILL BE REMOVED WHEN YOU FINALIZE YOUR NOTE. IF YOU WANT TO FAX A PRELIMINARY NOTE YOU WILL NEED TO TOGGLE THIS TO 'NO' IF YOU DO NOT WANT IT IN YOUR FAXED NOTE.
Bill 59 Modifier?: No - Continue to Bill 79 Modifier

## 2025-07-21 ENCOUNTER — APPOINTMENT (OUTPATIENT)
Dept: URBAN - METROPOLITAN AREA CLINIC 145 | Facility: CLINIC | Age: 85
Setting detail: DERMATOLOGY
End: 2025-07-21

## 2025-07-21 DIAGNOSIS — Z48.02 ENCOUNTER FOR REMOVAL OF SUTURES: ICD-10-CM

## 2025-07-21 PROCEDURE — ? SUTURE REMOVAL (GLOBAL PERIOD)

## 2025-07-21 PROCEDURE — ? PHOTO-DOCUMENTATION

## 2025-07-21 ASSESSMENT — LOCATION SIMPLE DESCRIPTION DERM: LOCATION SIMPLE: RIGHT CHEEK

## 2025-07-21 ASSESSMENT — LOCATION ZONE DERM: LOCATION ZONE: FACE

## 2025-07-21 ASSESSMENT — LOCATION DETAILED DESCRIPTION DERM: LOCATION DETAILED: RIGHT SUPERIOR CENTRAL MALAR CHEEK

## 2025-07-21 NOTE — PROCEDURE: SUTURE REMOVAL (GLOBAL PERIOD)
Detail Level: Detailed
Add 68601 Cpt? (Important Note: In 2017 The Use Of 57066 Is Being Tracked By Cms To Determine Future Global Period Reimbursement For Global Periods): yes
Suture Removal Completed By (Optional): Olga Lidia MATTHEWS MA

## 2025-07-29 ENCOUNTER — APPOINTMENT (OUTPATIENT)
Dept: URBAN - METROPOLITAN AREA CLINIC 145 | Facility: CLINIC | Age: 85
Setting detail: DERMATOLOGY
End: 2025-07-29

## 2025-07-29 DIAGNOSIS — L57.8 OTHER SKIN CHANGES DUE TO CHRONIC EXPOSURE TO NONIONIZING RADIATION: ICD-10-CM

## 2025-07-29 DIAGNOSIS — D36.1 BENIGN NEOPLASM OF PERIPHERAL NERVES AND AUTONOMIC NERVOUS SYSTEM: ICD-10-CM

## 2025-07-29 DIAGNOSIS — L57.0 ACTINIC KERATOSIS: ICD-10-CM

## 2025-07-29 DIAGNOSIS — Z71.89 OTHER SPECIFIED COUNSELING: ICD-10-CM

## 2025-07-29 DIAGNOSIS — D18.0 HEMANGIOMA: ICD-10-CM

## 2025-07-29 DIAGNOSIS — L82.1 OTHER SEBORRHEIC KERATOSIS: ICD-10-CM

## 2025-07-29 DIAGNOSIS — Z85.828 PERSONAL HISTORY OF OTHER MALIGNANT NEOPLASM OF SKIN: ICD-10-CM

## 2025-07-29 PROBLEM — D18.01 HEMANGIOMA OF SKIN AND SUBCUTANEOUS TISSUE: Status: ACTIVE | Noted: 2025-07-29

## 2025-07-29 PROBLEM — D36.13 BENIGN NEOPLASM OF PERIPHERAL NERVES AND AUTONOMIC NERVOUS SYSTEM OF LOWER LIMB, INCLUDING HIP: Status: ACTIVE | Noted: 2025-07-29

## 2025-07-29 PROBLEM — D23.72 OTHER BENIGN NEOPLASM OF SKIN OF LEFT LOWER LIMB, INCLUDING HIP: Status: ACTIVE | Noted: 2025-07-29

## 2025-07-29 PROCEDURE — ? LIQUID NITROGEN

## 2025-07-29 PROCEDURE — ? COUNSELING

## 2025-07-29 ASSESSMENT — LOCATION DETAILED DESCRIPTION DERM
LOCATION DETAILED: STERNUM
LOCATION DETAILED: RIGHT SUPERIOR LATERAL NECK
LOCATION DETAILED: STERNAL NOTCH
LOCATION DETAILED: PERIUMBILICAL SKIN
LOCATION DETAILED: LEFT PROXIMAL DORSAL FOREARM
LOCATION DETAILED: RIGHT PROXIMAL DORSAL FOREARM
LOCATION DETAILED: LEFT DISTAL DORSAL FOREARM
LOCATION DETAILED: LEFT DISTAL POSTERIOR THIGH
LOCATION DETAILED: RIGHT DISTAL POSTERIOR THIGH
LOCATION DETAILED: LEFT ANTERIOR DISTAL THIGH
LOCATION DETAILED: SUPERIOR LUMBAR SPINE
LOCATION DETAILED: INFERIOR THORACIC SPINE

## 2025-07-29 ASSESSMENT — LOCATION ZONE DERM
LOCATION ZONE: NECK
LOCATION ZONE: ARM
LOCATION ZONE: TRUNK
LOCATION ZONE: LEG

## 2025-07-29 ASSESSMENT — LOCATION SIMPLE DESCRIPTION DERM
LOCATION SIMPLE: ABDOMEN
LOCATION SIMPLE: UPPER BACK
LOCATION SIMPLE: LEFT THIGH
LOCATION SIMPLE: LOWER BACK
LOCATION SIMPLE: CHEST
LOCATION SIMPLE: RIGHT FOREARM
LOCATION SIMPLE: LEFT FOREARM
LOCATION SIMPLE: NECK
LOCATION SIMPLE: RIGHT POSTERIOR THIGH
LOCATION SIMPLE: LEFT POSTERIOR THIGH